# Patient Record
Sex: MALE
[De-identification: names, ages, dates, MRNs, and addresses within clinical notes are randomized per-mention and may not be internally consistent; named-entity substitution may affect disease eponyms.]

---

## 2018-04-24 PROBLEM — Z00.00 ENCOUNTER FOR PREVENTIVE HEALTH EXAMINATION: Status: ACTIVE | Noted: 2018-04-24

## 2018-04-26 ENCOUNTER — APPOINTMENT (OUTPATIENT)
Dept: CARDIOLOGY | Facility: CLINIC | Age: 83
End: 2018-04-26

## 2018-04-26 VITALS
HEIGHT: 67 IN | TEMPERATURE: 97.8 F | BODY MASS INDEX: 39.87 KG/M2 | SYSTOLIC BLOOD PRESSURE: 138 MMHG | DIASTOLIC BLOOD PRESSURE: 56 MMHG | WEIGHT: 254 LBS | HEART RATE: 76 BPM | OXYGEN SATURATION: 100 %

## 2018-04-26 DIAGNOSIS — Z86.39 PERSONAL HISTORY OF OTHER ENDOCRINE, NUTRITIONAL AND METABOLIC DISEASE: ICD-10-CM

## 2018-04-26 DIAGNOSIS — Z86.59 PERSONAL HISTORY OF OTHER MENTAL AND BEHAVIORAL DISORDERS: ICD-10-CM

## 2018-04-26 DIAGNOSIS — Z87.19 PERSONAL HISTORY OF OTHER DISEASES OF THE DIGESTIVE SYSTEM: ICD-10-CM

## 2018-04-26 DIAGNOSIS — Z87.09 PERSONAL HISTORY OF OTHER DISEASES OF THE RESPIRATORY SYSTEM: ICD-10-CM

## 2018-04-26 DIAGNOSIS — Z87.39 PERSONAL HISTORY OF OTHER DISEASES OF THE MUSCULOSKELETAL SYSTEM AND CONNECTIVE TISSUE: ICD-10-CM

## 2018-04-26 DIAGNOSIS — Z87.898 PERSONAL HISTORY OF OTHER SPECIFIED CONDITIONS: ICD-10-CM

## 2018-04-26 DIAGNOSIS — R06.09 OTHER FORMS OF DYSPNEA: ICD-10-CM

## 2018-04-26 DIAGNOSIS — Z87.891 PERSONAL HISTORY OF NICOTINE DEPENDENCE: ICD-10-CM

## 2018-04-26 DIAGNOSIS — R73.03 PREDIABETES.: ICD-10-CM

## 2018-04-26 DIAGNOSIS — Z86.79 PERSONAL HISTORY OF OTHER DISEASES OF THE CIRCULATORY SYSTEM: ICD-10-CM

## 2018-04-26 DIAGNOSIS — R01.1 CARDIAC MURMUR, UNSPECIFIED: ICD-10-CM

## 2018-04-27 ENCOUNTER — NON-APPOINTMENT (OUTPATIENT)
Age: 83
End: 2018-04-27

## 2018-04-28 PROBLEM — Z87.39 HISTORY OF ARTHRITIS: Status: RESOLVED | Noted: 2018-04-28 | Resolved: 2018-04-28

## 2018-04-28 PROBLEM — R73.03 PRE-DIABETES: Status: RESOLVED | Noted: 2018-04-28 | Resolved: 2018-04-28

## 2018-04-28 PROBLEM — Z87.898 HISTORY OF PALPITATIONS: Status: RESOLVED | Noted: 2018-04-28 | Resolved: 2018-04-28

## 2018-04-28 PROBLEM — Z87.39 HISTORY OF NECK PAIN: Status: RESOLVED | Noted: 2018-04-28 | Resolved: 2018-04-28

## 2018-04-28 PROBLEM — Z86.59 HISTORY OF DEPRESSION: Status: RESOLVED | Noted: 2018-04-28 | Resolved: 2018-04-28

## 2018-04-28 PROBLEM — R01.1 CARDIAC MURMUR: Status: ACTIVE | Noted: 2018-04-26

## 2018-04-28 PROBLEM — Z86.39 HISTORY OF HYPERLIPIDEMIA: Status: RESOLVED | Noted: 2018-04-28 | Resolved: 2018-04-28

## 2018-04-28 PROBLEM — Z87.19 HISTORY OF INGUINAL HERNIA: Status: RESOLVED | Noted: 2018-04-28 | Resolved: 2018-04-28

## 2018-04-28 PROBLEM — Z86.79 HISTORY OF HYPERTENSION: Status: RESOLVED | Noted: 2018-04-28 | Resolved: 2018-04-28

## 2018-04-28 PROBLEM — R06.09 DYSPNEA ON EXERTION: Status: ACTIVE | Noted: 2018-04-26

## 2018-04-28 PROBLEM — Z87.19 HISTORY OF CHRONIC CONSTIPATION: Status: RESOLVED | Noted: 2018-04-28 | Resolved: 2018-04-28

## 2018-04-28 PROBLEM — Z87.09 HISTORY OF ASTHMA: Status: RESOLVED | Noted: 2018-04-28 | Resolved: 2018-04-28

## 2018-04-28 PROBLEM — Z87.891 FORMER SMOKER: Status: ACTIVE | Noted: 2018-04-28

## 2018-04-28 RX ORDER — ASPIRIN 81 MG
81 TABLET, DELAYED RELEASE (ENTERIC COATED) ORAL
Refills: 0 | Status: ACTIVE | COMMUNITY

## 2018-04-28 RX ORDER — ATENOLOL 50 MG/1
TABLET ORAL
Refills: 0 | Status: ACTIVE | COMMUNITY

## 2018-04-28 RX ORDER — OXYCODONE AND ACETAMINOPHEN TABLETS 10; 300 MG/1; MG/1
TABLET ORAL
Refills: 0 | Status: ACTIVE | COMMUNITY

## 2018-04-28 RX ORDER — ESCITALOPRAM OXALATE 5 MG/1
TABLET, FILM COATED ORAL
Refills: 0 | Status: ACTIVE | COMMUNITY

## 2018-05-07 ENCOUNTER — HOSPITAL ENCOUNTER (INPATIENT)
Dept: HOSPITAL 74 - FER | Age: 83
LOS: 3 days | Discharge: HOME HEALTH | DRG: 351 | End: 2018-05-10
Attending: INTERNAL MEDICINE | Admitting: INTERNAL MEDICINE
Payer: COMMERCIAL

## 2018-05-07 VITALS — BODY MASS INDEX: 31.6 KG/M2

## 2018-05-07 DIAGNOSIS — M54.9: ICD-10-CM

## 2018-05-07 DIAGNOSIS — J90: ICD-10-CM

## 2018-05-07 DIAGNOSIS — E78.5: ICD-10-CM

## 2018-05-07 DIAGNOSIS — B37.0: ICD-10-CM

## 2018-05-07 DIAGNOSIS — I10: ICD-10-CM

## 2018-05-07 DIAGNOSIS — F41.8: ICD-10-CM

## 2018-05-07 DIAGNOSIS — R59.0: ICD-10-CM

## 2018-05-07 DIAGNOSIS — R16.0: ICD-10-CM

## 2018-05-07 DIAGNOSIS — I27.20: ICD-10-CM

## 2018-05-07 DIAGNOSIS — K40.30: Primary | ICD-10-CM

## 2018-05-07 DIAGNOSIS — E87.1: ICD-10-CM

## 2018-05-07 DIAGNOSIS — N39.0: ICD-10-CM

## 2018-05-07 DIAGNOSIS — I08.3: ICD-10-CM

## 2018-05-07 DIAGNOSIS — I45.10: ICD-10-CM

## 2018-05-07 DIAGNOSIS — I45.81: ICD-10-CM

## 2018-05-07 DIAGNOSIS — N40.0: ICD-10-CM

## 2018-05-07 DIAGNOSIS — K76.9: ICD-10-CM

## 2018-05-07 DIAGNOSIS — Z87.891: ICD-10-CM

## 2018-05-07 DIAGNOSIS — E87.5: ICD-10-CM

## 2018-05-07 DIAGNOSIS — D64.9: ICD-10-CM

## 2018-05-07 DIAGNOSIS — K75.9: ICD-10-CM

## 2018-05-07 DIAGNOSIS — M19.90: ICD-10-CM

## 2018-05-07 DIAGNOSIS — R91.1: ICD-10-CM

## 2018-05-07 DIAGNOSIS — K40.90: ICD-10-CM

## 2018-05-07 DIAGNOSIS — R01.1: ICD-10-CM

## 2018-05-07 LAB
ALBUMIN SERPL-MCNC: 3 G/DL (ref 3.5–5)
ALP SERPL-CCNC: 129 U/L (ref 32–92)
ALT SERPL-CCNC: 29 U/L (ref 10–40)
ANION GAP SERPL CALC-SCNC: 12 MMOL/L (ref 8–16)
APTT BLD: 24.2 SECONDS (ref 24–38.9)
AST SERPL-CCNC: 33 U/L (ref 10–42)
BACTERIA #/AREA URNS HPF: (no result) /HPF
BILIRUB SERPL-MCNC: 0.9 MG/DL (ref 0.2–1)
BILIRUB UR STRIP.AUTO-MCNC: (no result) MG/DL
BUN SERPL-MCNC: 33 MG/DL (ref 7–18)
CALCIUM SERPL-MCNC: 8.4 MG/DL (ref 8.4–10.2)
CHLORIDE SERPL-SCNC: 93 MMOL/L (ref 98–107)
CO2 SERPL-SCNC: 23 MMOL/L (ref 22–28)
COLOR UR: YELLOW
CREAT SERPL-MCNC: 1.1 MG/DL (ref 0.6–1.3)
DEPRECATED RDW RBC AUTO: 15.3 % (ref 11.9–15.9)
DEPRECATED RDW RBC AUTO: 16.8 % (ref 11.9–15.9)
EPITH CASTS URNS QL MICRO: (no result) /HPF
GLUCOSE SERPL-MCNC: 142 MG/DL (ref 74–106)
HCT VFR BLD CALC: 28.1 % (ref 35.4–49)
HCT VFR BLD CALC: 33.7 % (ref 35.4–49)
HGB BLD-MCNC: 11.1 GM/DL (ref 11.7–16.9)
HGB BLD-MCNC: 9.3 GM/DL (ref 11.7–16.9)
INR BLD: 1.19 (ref 0.82–1.09)
KETONES UR QL STRIP: (no result)
MCH RBC QN AUTO: 26 PG (ref 25.7–33.7)
MCH RBC QN AUTO: 26.3 PG (ref 25.7–33.7)
MCHC RBC AUTO-ENTMCNC: 32.9 G/DL (ref 32–35.9)
MCHC RBC AUTO-ENTMCNC: 33.2 G/DL (ref 32–35.9)
MCV RBC: 78.9 FL (ref 80–96)
MCV RBC: 79.2 FL (ref 80–96)
PH UR: 5 [PH] (ref 4.5–8)
PLATELET # BLD AUTO: 271 K/MM3 (ref 134–434)
PLATELET # BLD AUTO: 359 K/MM3 (ref 134–434)
PLATELET BLD QL SMEAR: ADEQUATE
PMV BLD: 7.8 FL (ref 7.5–11.1)
PMV BLD: 7.8 FL (ref 7.5–11.1)
POTASSIUM SERPLBLD-SCNC: 5.2 MMOL/L (ref 3.5–5.1)
PROT SERPL-MCNC: 6.9 G/DL (ref 6.4–8.3)
PT PNL PPP: 13.3 SEC (ref 10.2–13)
RBC # BLD AUTO: (no result) /HPF (ref 0–3)
RBC # BLD AUTO: 3.55 M/MM3 (ref 4–5.6)
RBC # BLD AUTO: 4.27 M/MM3 (ref 4–5.6)
SODIUM SERPL-SCNC: 128 MMOL/L (ref 136–145)
SP GR UR: >= 1.03 (ref 1–1.02)
UROBILINOGEN UR STRIP-MCNC: 0.2 MG/DL (ref 0.2–1)
WBC # BLD AUTO: 10 K/MM3 (ref 4–10)
WBC # BLD AUTO: 12.2 K/MM3 (ref 4–10.8)
WBC # UR AUTO: (no result) /UL (ref 0–2)

## 2018-05-07 NOTE — HP
CHIEF COMPLAINT: abdominal pain, n/v





PCP: Dr. Azul ([phone])


FAMILY NUMBERS:


HOME: [phone]


CELL: [phone] (son, Manolo)





HISTORY OF PRESENT ILLNESS:





83 year old male with a history of HTN, osteoarthritis, hx of hepatitis, 

bilateral inguinal hernias and cholecystectomy in 2000 presents to the hospital 

with 3 day hx of n/v/abdominal pain. Patient states that he was treated for UTI/

hematuria a week ago with amoxicillin. He reports that after he finished taking 

the course of antibiotic, he began having multiple episodes of non-bloody 

emesis. Patient's family reports that the vomitus appeared green and bilious at 

times. He has been unable to take anything by mouth. Patient states that he is 

passing gas, but his last bowel movement was yesterday and was solid/hard. 

Patient states that his He denies current abdominal pain, chest pain, shortness 

of breath, fevers, chills.





ER course was notable for:


(1) hyponatremia (128)


(2) hyperkalemia (5.2)


(3) WBC 12.2





PAST MEDICAL HISTORY: HTN. arthritis, hepatitis, b/l inguinal hernias





PAST SURGICAL HISTORY: cholecystectomy 2000





Social History:


Smoking: none


Alcohol: none


Drugs: none





Allergies





No Known Allergies Allergy (Unverified 05/07/18 14:37)


 








HOME MEDICATIONS:


 Home Medications











 Medication  Instructions  Recorded


 


Amlodipine Besylate [Norvasc -] 10 mg PO DAILY 05/07/18


 


Aspirin [Aspirin EC] 81 mg PO DAILY 05/07/18


 


Citalopram Hydrobromide [Celexa -] 20 mg PO DAILY 05/07/18


 


Labetalol HCl [Normodyne -] 300 mg PO BID 05/07/18


 


Oxycodone HCl/Acetaminophen 1 tab PO Q6H 05/07/18





[Percocet 5-325 mg Tablet]  


 


Simvastatin [Zocor -] 40 mg PO DAILY 05/07/18








REVIEW OF SYSTEMS


CONSTITUTIONAL: 


Absent:  fever, chills, diaphoresis, generalized weakness, malaise, loss of 

appetite, weight change


HEENT: 


Absent:  rhinorrhea, nasal congestion, throat pain, throat swelling, difficulty 

swallowing, mouth swelling, ear pain, eye pain, visual changes


CARDIOVASCULAR: 


Absent: chest pain, syncope, palpitations, irregular heart rate, lightheadedness

, peripheral edema


RESPIRATORY: 


Absent: cough, shortness of breath, dyspnea with exertion, orthopnea, wheezing, 

stridor, hemoptysis


GASTROINTESTINAL: nausea, vomiting


Absent: abdominal pain, abdominal distension,  diarrhea, constipation, melena, 

hematochezia


GENITOURINARY: 


Absent: dysuria, frequency, urgency, hesitancy, hematuria, flank pain, genital 

pain


MUSCULOSKELETAL: 


Absent: myalgia, arthralgia, joint swelling, back pain, neck pain


SKIN: 


Absent: rash, itching, pallor


HEMATOLOGIC/IMMUNOLOGIC: 


Absent: easy bleeding, easy bruising, lymphadenopathy, frequent infections


ENDOCRINE:


Absent: unexplained weight gain, unexplained weight loss, heat intolerance, 

cold intolerance


NEUROLOGIC: 


Absent: headache, focal weakness or paresthesias, dizziness, unsteady gait, 

seizure, mental status changes, bladder or bowel incontinence


PSYCHIATRIC: 


Absent: anxiety, depression, suicidal or homicidal ideation, hallucinations.








PHYSICAL EXAMINATION


 Vital Signs - 24 hr











  05/07/18 05/07/18 05/07/18





  13:32 16:15 18:00


 


Temperature 98.6 F 98.9 F 


 


Pulse Rate 84  85


 


Pulse Rate [  88 





Right Radial]   


 


Respiratory 20 22 





Rate   


 


Blood Pressure 146/71  


 


Blood Pressure  138/78 





[Left Arm]   


 


O2 Sat by Pulse 96 95 95





Oximetry (%)   














  05/07/18 05/07/18 05/07/18





  18:50 19:00 20:01


 


Temperature 98.8 F  98.8 F


 


Pulse Rate 85  85


 


Pulse Rate [   





Right Radial]   


 


Respiratory 22  22





Rate   


 


Blood Pressure 124/55  124/55


 


Blood Pressure   





[Left Arm]   


 


O2 Sat by Pulse  93 L 





Oximetry (%)   











GENERAL: Awake, alert, and fully oriented, in no acute distress.


HEENT: PERRLA, EOMI, dry mucus membranes


NECK: No JVD


LUNGS: CTA, no wheezes


HEART: Regular rate and rhythm, normal S1 and S2, harsh systolic murmur 

appreciated on exam


ABDOMEN: Soft, nontender, not distended, normoactive bowel sounds, no guarding, 

no rebound, no masses.  No hepatomegaly or  splenomegaly. 


MUSCULOSKELETAL: Normal range of motion at all joints. No bony deformities or 

tenderness. No CVA tenderness.


EXTREMITIES: 2+ pulses, No peripheral edema.


NEUROLOGICAL:  Cranial nerves II-XII intact. Normal speech


PSYCHIATRIC: Cooperative. Good eye contact. Appropriate mood and affect.


SKIN: Warm, dry, normal turgor, no rashes or lesions noted, normal capillary 

refill. 





 Laboratory Results - last 24 hr











  05/07/18 05/07/18 05/07/18





  14:07 14:30 14:30


 


WBC   12.2 H 


 


RBC   4.27 


 


Hgb   11.1 L 


 


Hct   33.7 L 


 


MCV   78.9 L 


 


MCH   26.0 


 


MCHC   32.9 


 


RDW   15.3 


 


Plt Count   359 


 


MPV   7.8 


 


Neutrophils %   No Result Required. 


 


Neutrophils % (Manual)   85.0 H 


 


Band Neutrophils %   11.0 H 


 


Lymphocytes %   No Result Required. 


 


Lymphocytes % (Manual)   2.0 L 


 


Monocytes % (Manual)   2 L 


 


Platelet Estimate   Adequate 


 


PT with INR    13.3 H


 


INR    1.19


 


PTT (Actin FS)    24.2 L


 


Sodium   


 


Potassium   


 


Chloride   


 


Carbon Dioxide   


 


Anion Gap   


 


BUN   


 


Creatinine   


 


Creat Clearance w eGFR   


 


Random Glucose   


 


Lactic Acid   


 


Calcium   


 


Total Bilirubin   


 


AST   


 


ALT   


 


Alkaline Phosphatase   


 


Troponin I   


 


Total Protein   


 


Albumin   


 


Lipase   


 


Urine Color  Yellow  


 


Urine Appearance  Clear  


 


Urine pH  5.0  


 


Ur Specific Gravity  >= 1.030 H  


 


Urine Protein  Trace  


 


Urine Glucose (UA)  Negative  


 


Urine Ketones  2+ H  


 


Urine Blood  1+ H  


 


Urine Nitrite  Negative  


 


Urine Bilirubin  2+ H  


 


Urine Urobilinogen  0.2  


 


Ur Leukocyte Esterase  Negative  


 


Urine RBC  8-10  


 


Urine WBC  0-3  


 


Ur Epithelial Cells  Rare  


 


Urine Bacteria  Rare  














  05/07/18 05/07/18 05/07/18





  14:30 14:30 14:30


 


WBC   


 


RBC   


 


Hgb   


 


Hct   


 


MCV   


 


MCH   


 


MCHC   


 


RDW   


 


Plt Count   


 


MPV   


 


Neutrophils %   


 


Neutrophils % (Manual)   


 


Band Neutrophils %   


 


Lymphocytes %   


 


Lymphocytes % (Manual)   


 


Monocytes % (Manual)   


 


Platelet Estimate   


 


PT with INR   


 


INR   


 


PTT (Actin FS)   


 


Sodium  128 L  


 


Potassium  5.2 H  


 


Chloride  93 L  


 


Carbon Dioxide  23  


 


Anion Gap  12  


 


BUN  33 H  


 


Creatinine  1.1  


 


Creat Clearance w eGFR  > 60  


 


Random Glucose  142 H  


 


Lactic Acid   


 


Calcium  8.4  


 


Total Bilirubin  0.9  


 


AST  33  


 


ALT  29  


 


Alkaline Phosphatase  129 H  


 


Troponin I    < 0.03


 


Total Protein  6.9  


 


Albumin  3.0 L  


 


Lipase   53 L 


 


Urine Color   


 


Urine Appearance   


 


Urine pH   


 


Ur Specific Gravity   


 


Urine Protein   


 


Urine Glucose (UA)   


 


Urine Ketones   


 


Urine Blood   


 


Urine Nitrite   


 


Urine Bilirubin   


 


Urine Urobilinogen   


 


Ur Leukocyte Esterase   


 


Urine RBC   


 


Urine WBC   


 


Ur Epithelial Cells   


 


Urine Bacteria   














  05/07/18





  17:51


 


WBC 


 


RBC 


 


Hgb 


 


Hct 


 


MCV 


 


MCH 


 


MCHC 


 


RDW 


 


Plt Count 


 


MPV 


 


Neutrophils % 


 


Neutrophils % (Manual) 


 


Band Neutrophils % 


 


Lymphocytes % 


 


Lymphocytes % (Manual) 


 


Monocytes % (Manual) 


 


Platelet Estimate 


 


PT with INR 


 


INR 


 


PTT (Actin FS) 


 


Sodium 


 


Potassium 


 


Chloride 


 


Carbon Dioxide 


 


Anion Gap 


 


BUN 


 


Creatinine 


 


Creat Clearance w eGFR 


 


Random Glucose 


 


Lactic Acid  1.1


 


Calcium 


 


Total Bilirubin 


 


AST 


 


ALT 


 


Alkaline Phosphatase 


 


Troponin I 


 


Total Protein 


 


Albumin 


 


Lipase 


 


Urine Color 


 


Urine Appearance 


 


Urine pH 


 


Ur Specific Gravity 


 


Urine Protein 


 


Urine Glucose (UA) 


 


Urine Ketones 


 


Urine Blood 


 


Urine Nitrite 


 


Urine Bilirubin 


 


Urine Urobilinogen 


 


Ur Leukocyte Esterase 


 


Urine RBC 


 


Urine WBC 


 


Ur Epithelial Cells 


 


Urine Bacteria 





IMAGING:


CT Abd/Pelvis (5/7):


1. High-grade small bowel obstruction is presumably complete, with abrupt 

transition point in a large RIGHT inguinal hernia. Collapsed distal small bowel 

and colon. 


2. Very large LEFT inguinal hernia containing nearly the entire descending and 

sigmoid colon with no evidence of obstruction. 


3. Multiple indeterminant hepatic mass lesions, concerning for metastasis. 

Please correlate with medical history. Complete characterization requires 

multiphase contrast-enhanced MRI or CT of the liver. 


4. Enlarged portacaval and peripancreatic lymph nodes are also concerning for 

malignancy.


5. Small layering right pleural effusion. Opacity in the posterior right lung 

base may be atelectasis and/or pneumonia. 


6. Cardiomegaly, at least moderate coronary artery calcific atherosclerosis and 

aortic valve calcification. Please correlate clinically for aortic stenosis. 


7. Enlarged prostate gland. Please correlate with PSA and physical exam.








ASSESSMENT/PLAN:





83 year old male hx HTN and large bilateral hernias presented to the hospital 

with n/v and abdominal pain and found to have total small bowel obstruction 

within right inguinal hernia





#Complete Small Bowel Obstruction: patient is clinically improving with no 

overt abdominal pain and decreased frequency of vomiting


-NPO except meds


-IVF w/ NS @ 100cc/hr


-surgery consult Dr. Olmstead Appreciated


-coags, type and screen


-repeat labs in AM


-lactate normal


-NGT offered, but family decided to wait until the morning to check for 

improvement





#Harsh Systolic Ejection Murmur: patient had just seen a cardiologist the prior 

day and an echo was ordered by never performed


-cards consult Dr. Rush appreciated


-echo ordered


-EKG: NSR w/ RBBB, QTc prolonged at 503, avoid QTC prolonging agents





#Hyponatremia: patient's sodium 128


-IVF NS @ 100cc/hr


-repeat labs now and in AM





#Hyperkalemia: 5.2


-monitor potassium in AM





#Liver Lesions: could be related to metastatic process


-oncology consultation appreciated


-consider multi-phsae contrast MRI as per recommendations from radiology





#Anemia: could be chronic vs related to obstruction


-Hgb 11.1





#FEN


-NS @ 100cc/hr


-NPO except meds/some ice chips


-replete lytes in AM as necessary





#Prophylaxis


-heparin 5000 subq TID





#Disposition


-Monitor on med surg


-family wants to make decisions together for patient, phone numbers listed on 

top of note


-full code for now





Visit type





- Emergency Visit


Emergency Visit: No





- New Patient


This patient is new to me today: Yes


Date on this admission: 05/07/18





- Critical Care


Critical Care patient: No





Hospitalist Screening





- Colonoscopy Questionnaire


Colonoscopy Questionnaire: 





Colonoscopy Questionnaire








-   Patient:


50 - 75 years old and never had a screening colonoscopy: No


History of colon or rectal polyps, or CA: No


History of IBD, Crohn's disease or UC: No


History of abdominal radiation therapy as a child: No





-   Relative:


1 with colon or rectal CA, or polyps at age 60 or younger: Unknown


Colon or rectal CA diagnosed at age 45 or younger: Unknown


Multiple relatives with colon or rectal CA: Unknown





-   Outcome:


Screening Result: Negative Screen

## 2018-05-07 NOTE — PDOC
History of Present Illness





- General


History Source: Patient, Family


Exam Limitations: No Limitations





<Yann Sharma - Last Filed: 05/07/18 17:37>





- General


History Source: Patient, Family


Exam Limitations: No Limitations





- History of Present Illness


Initial Comments: 





05/07/18 15:27


The patient is an 83 year old male with a significant past medical history of 

hypertension, inguinal hernia, chronic back pain, anxiety, hyperlipidemia, and 

depression who presents to the emergency for evaluation of a 2 day history of 

nausea and vomiting. The patient reports persistent episodes of nausea, vomiting

, constipation, and lack of appetite. He reports intermittent episodes of 

abdominal discomfort. Of note, the patient reports being recently diagnosed for 

a urinary tract infection and finished course of amoxicillin one week ago. 





The patient denies chest pain, shortness of breath, headache, and dizziness.


Denies fevers, chills, diarrhea, and changes to urination. 





Allergies: NKDA


Past surgical history: Inguinal hernia, Cholecystectomy


Social history: Former smoker 2003. No reported alcohol or drug use.


PCP: Dr. Azul ([phone])








<Tulio Anderson - Last Filed: 05/07/18 18:23>





- General


Chief Complaint: Nausea/Vomiting


Stated Complaint: VOMITING


Time Seen by Provider: 05/07/18 13:41





Past History





- Past Medical History


COPD: No


HTN: Yes


Hypercholesterolemia: Yes


Psychiatric Problems: Yes (DEPRESSION)


Other medical history: INGUINAL HERNIA





- Surgical History


Cholecystectomy: Yes





- Suicide/Smoking/Psychosocial Hx


Smoking History: Former smoker


Have you smoked in the past 12 months: No


If you are a former smoker, when did you quit?: 2003


Information on smoking cessation initiated: No


Hx Alcohol Use: No


Drug/Substance Use Hx: No


Substance Use Type: None





<Yann Sharma - Last Filed: 05/07/18 17:37>





<Tulio Anderson - Last Filed: 05/07/18 18:23>





- Past Medical History


Allergies/Adverse Reactions: 


 Allergies











Allergy/AdvReac Type Severity Reaction Status Date / Time


 


No Known Allergies Allergy   Unverified 05/07/18 14:37











Home Medications: 


Ambulatory Orders





Amlodipine Besylate [Norvasc -] 10 mg PO DAILY 05/07/18 


Aspirin [Aspirin EC] 81 mg PO DAILY 05/07/18 


Citalopram Hydrobromide [Celexa -] 20 mg PO DAILY 05/07/18 


Labetalol HCl [Normodyne -] 300 mg PO BID 05/07/18 


Simvastatin [Zocor -] 40 mg PO DAILY 05/07/18 











**Review of Systems





- Review of Systems


Able to Perform ROS?: Yes


Comments:: 





GENERAL/CONSTITUTIONAL: No fever or chills. No weakness.


HEAD, EYES, EARS, NOSE AND THROAT: No change in vision. No ear pain or 

discharge. No sore throat.


CARDIOVASCULAR: No chest pain or shortness of breath.


RESPIRATORY: No cough, wheezing, or hemoptysis.


GASTROINTESTINAL: (+)Nausea. (+)Vomiting. (+)Constipation. 


GENITOURINARY: No dysuria, frequency, or change in urination.


MUSCULOSKELETAL: (+)Abdominal discomfort. No joint or muscle swelling. No neck 

or back pain.


SKIN: No rash


NEUROLOGIC: No headache, vertigo, loss of consciousness, or change in strength/

sensation.


ENDOCRINE: No increased thirst. No abnormal weight change.


HEMATOLOGIC/LYMPHATIC: No anemia, easy bleeding, or history of blood clots.


ALLERGIC/IMMUNOLOGIC: No hives or skin allergy.





<Tulio Anderson - Last Filed: 05/07/18 18:23>





*Physical Exam





- Vital Signs


 Last Vital Signs











Temp Pulse Resp BP Pulse Ox


 


 98.6 F   84   20   146/71   96 


 


 05/07/18 13:32  05/07/18 13:32  05/07/18 13:32  05/07/18 13:32  05/07/18 13:32














<Yann Sharma - Last Filed: 05/07/18 17:37>





- Vital Signs


 Last Vital Signs











Temp Pulse Resp BP Pulse Ox


 


 98.6 F   84   20   146/71    


 


 05/07/18 13:32  05/07/18 13:32  05/07/18 13:32  05/07/18 13:32  05/07/18 13:32














- Physical Exam


Comments: 





GENERAL: (+)Obese. Awake, alert, and fully oriented, in no acute distress


HEAD: No signs of trauma


EYES: PERRLA, EOMI, sclera anicteric, conjunctiva clear


ENT: Auricles normal inspection, hearing grossly normal, nares patent, Moist 

mucosa


NECK: Normal ROM, supple, no JVD, or masses


LUNGS: Breath sounds equal, clear to auscultation bilaterally.  No wheezes, and 

no crackles


HEART: Regular rate and rhythm, normal S1 and S2, no murmurs, rubs or gallops


ABDOMEN: Soft, nontender. No guarding, no rebound.  No masses


MUSCULOSKELETAL: (+)Large inguinal hernia on left. No testicular tenderness. 


EXTREMITIES: Normal range of motion, no edema.  No clubbing or cyanosis. No 

cords, erythema, or tenderness


NEUROLOGICAL: Cranial nerves II through XII grossly intact.  Normal speech, 

normal gait


SKIN: Warm, Dry, normal turgor, no rashes or lesions noted.








<Tulio Anderson - Last Filed: 05/07/18 18:23>





ED Treatment Course





- LABORATORY


CBC & Chemistry Diagram: 


 05/07/18 14:30





 05/07/18 14:30





- RADIOLOGY


Radiology Studies Ordered: 














 Category Date Time Status


 


 ABDOMEN & PELVIS CT WITH CONTR [CT] Stat CT Scan  05/07/18 14:44 Ordered


 


 CHEST X-RAY PORTABLE* [RAD] Stat Radiology  05/07/18 13:56 Taken














<Yann Sharma - Last Filed: 05/07/18 17:37>





- LABORATORY


CBC & Chemistry Diagram: 


 05/07/18 14:30





 05/07/18 14:30





- ADDITIONAL ORDERS


Additional order review: 


 Laboratory  Results











  05/07/18 05/07/18





  14:30 14:07


 


Sodium  128 L 


 


Potassium  5.2 H 


 


Chloride  93 L 


 


Carbon Dioxide  23 


 


Anion Gap  12 


 


BUN  33 H 


 


Creatinine  1.1 


 


Creat Clearance w eGFR  > 60 


 


Random Glucose  142 H 


 


Calcium  8.4 


 


Total Bilirubin  0.9 


 


AST  33 


 


ALT  29 


 


Alkaline Phosphatase  129 H 


 


Total Protein  6.9 


 


Albumin  3.0 L 


 


Urine Color   Yellow


 


Urine Appearance   Clear


 


Urine pH   5.0


 


Ur Specific Gravity   >= 1.030 H


 


Urine Protein   Trace


 


Urine Glucose (UA)   Negative


 


Urine Ketones   2+ H


 


Urine Blood   1+ H


 


Urine Nitrite   Negative


 


Urine Bilirubin   2+ H


 


Urine Urobilinogen   0.2


 


Ur Leukocyte Esterase   Negative








 











  05/07/18





  14:30


 


RBC  4.27


 


MCV  78.9 L


 


MCHC  32.9


 


RDW  15.3


 


MPV  7.8


 


Neutrophils %  No Result Required.


 


Lymphocytes %  No Result Required.














- Medications


Given in the ED: 


ED Medications














Discontinued Medications














Generic Name Dose Route Start Last Admin





  Trade Name Freq  PRN Reason Stop Dose Admin


 


Lorazepam  1 mg  05/07/18 14:40  05/07/18 15:18





  Ativan Injection -  IVPUSH  05/07/18 14:41  1 mg





  ONCE ONE   Administration














<Tulio Anderson - Last Filed: 05/07/18 18:23>





Medical Decision Making





- Medical Decision Making





05/07/18 14:49





A portion of this note was documented by scribe services under my direction. I 

have reviewed the details of the note, within reason, and agree with the 

documentation with the following case summary and management plan written by 

me. 





Patient treated in the ED.





Nursing notes are reviewed and incorporated into the medical decision-making.


Vital signs reviewed.





Peripheral IV access obtained by the nurse, laboratory studies are drawn and 

sent, reviewed and interpreted by myself. 





 Vital Signs











Temp Pulse Resp BP Pulse Ox


 


 98.6 F   84   20   146/71   96 


 


 05/07/18 13:32  05/07/18 13:32  05/07/18 13:32  05/07/18 13:32  05/07/18 13:32








83-year-old male with past medical history of hypertension, hyperlipidemia, 

anxiety and depression, left inguinal hernia presents with nausea and vomiting. 

Several weeks ago, the patient was recently diagnosed with a urinary tract 

infection and was placed on 2 prescriptions including most recently amoxicillin 

that was completed one week ago. The last several days, the patient has been 

having persistent nausea and vomiting and decreased appetite. Has had 

intermittent abdominal discomfort but no diarrhea. The patient does report 

having constipation and unclear when last bowel movement. However, the patient 

does take narcotics for his chronic back pain. Patient denies fevers or chills. 

Denies chest pain or shortness of breath.





Differential includes gastroenteritis, constipation, bowel obstruction, hernia, 

urinary tract infection, acute coronary syndrome. We'll obtain labs, chest x-ray

, urinalysis and a CAT scan the abdomen pelvis and reassess.


05/07/18 17:32





 CBC, BMP





 05/07/18 14:30 





 05/07/18 14:30 





 CMP











Sodium  128 mmol/L (136-145)  L  05/07/18  14:30    


 


Potassium  5.2 mmol/L (3.5-5.1)  H  05/07/18  14:30    


 


Chloride  93 mmol/L ()  L  05/07/18  14:30    


 


Carbon Dioxide  23 mmol/L (22-28)   05/07/18  14:30    


 


Anion Gap  12  (8-16)   05/07/18  14:30    


 


BUN  33 mg/dl (7-18)  H  05/07/18  14:30    


 


Creatinine  1.1 mg/dl (0.6-1.3)   05/07/18  14:30    


 


Creat Clearance w eGFR  > 60  (>60)   05/07/18  14:30    


 


Random Glucose  142 mg/dl ()  H  05/07/18  14:30    


 


Calcium  8.4 mg/dl (8.4-10.2)   05/07/18  14:30    


 


Total Bilirubin  0.9 mg/dl (0.2-1.0)   05/07/18  14:30    


 


AST  33 U/L (10-42)   05/07/18  14:30    


 


ALT  29 U/L (10-40)   05/07/18  14:30    


 


Alkaline Phosphatase  129 U/L (32-92)  H  05/07/18  14:30    


 


Troponin I  < 0.03 ng/ml (0.00-0.06)   05/07/18  14:30    


 


Total Protein  6.9 g/dl (6.4-8.3)   05/07/18  14:30    


 


Albumin  3.0 g/dl (3.5-5.0)  L  05/07/18  14:30    


 


Lipase  53 U/L ()  L  05/07/18  14:30    








 Urine Test Results











Urine Color  Yellow   05/07/18  14:07    


 


Urine Appearance  Clear   05/07/18  14:07    


 


Urine pH  5.0  (4.5-8)   05/07/18  14:07    


 


Ur Specific Gravity  >= 1.030  (1.005-1.025)  H  05/07/18  14:07    


 


Urine Protein  Trace  (NEGATIVE)   05/07/18  14:07    


 


Urine Glucose (UA)  Negative  (NEGATIVE)   05/07/18  14:07    


 


Urine Ketones  2+  (NEGATIVE)  H  05/07/18  14:07    


 


Urine Blood  1+  (NEGATIVE)  H  05/07/18  14:07    


 


Urine Nitrite  Negative  (NEGATIVE)   05/07/18  14:07    


 


Urine Bilirubin  2+  (NEGATIVE)  H  05/07/18  14:07    


 


Ur Leukocyte Esterase  Negative  (NEGATIVE)   05/07/18  14:07    


 


Urine RBC  8-10 /hpf (0-3)   05/07/18  14:07    


 


Urine WBC  0-3  (0-2)   05/07/18  14:07    


 


Ur Epithelial Cells  Rare /HPF  05/07/18  14:07    


 


Urine Bacteria  Rare /hpf (NEGATIVE)   05/07/18  14:07    











05/07/18 17:37


CAT scan demonstrates high grade small bowel obstruction with abrupt transition 

point and a large right inguinal hernia. There is also very large left inguinal 

hernia. There is also hepatic mass lesions concerning for metastasis. There is 

also enlarged portacaval and pancreatic lymph nodes concerning for malignancy. 

Small right pleural effusion. I had given these results to the patient and the 

patient's wife. I also instructed him that I am concerned for cancer for this 

patient. The patient will need a malignancy workup. Given these symptoms and 

concerns, I had placed the patient for surgeon Dr. Nikko Olmstead. Bands were 

noted to be 11%. Blood cultures and lactic acid was ordered and empiric 

antibiotics vancomycin and Zosyn were ordered. In the complexity of the patient'

s case, decision was made to admit the patient to [facility]. The 

patient's family is agreeable to the plan. This is discussed with [facility] who accepts the patient to medical surgical admission. 





Case discussed in detail with admitting physician including history, physical 

exam and ancillary studies.





Admitting physician has assumed care for the patient, will follow all pending 

diagnostics and will complete the evaluation and treatment.  





<Yann Sharma - Last Filed: 05/07/18 17:37>





- Medical Decision Making





Case discussed with Dr. Olmstead at 18:23. 








<Tulio Anderson - Last Filed: 05/07/18 18:23>





*DC/Admit/Observation/Transfer





- Discharge Dispostion


Admit: Yes





<Yann Sharma - Last Filed: 05/07/18 17:37>





- Attestations


Scribe Attestion: 








Documentation prepared by Tulio Anderson, acting as medical scribe for Yann Sharma MD.





<Tulio Anderson - Last Filed: 05/07/18 18:23>


Diagnosis at time of Disposition: 


 Liver mass





Bowel obstruction


Qualifiers:


 Intestinal obstruction type: unspecified Intestinal obstruction extent: 

complete Qualified Code(s): K56.601 - Complete intestinal obstruction, 

unspecified as to cause








- Discharge Dispostion


Condition at time of disposition: Guarded





- Referrals


Referrals: 


Patel Azul MD [Primary Care Provider] - 





- Patient Instructions





- Post Discharge Activity

## 2018-05-07 NOTE — PN
Teaching Attending Note


Name of Resident: Virginia Moon





ATTENDING PHYSICIAN STATEMENT





I saw and evaluated the patient.


I reviewed the resident's note and discussed the case with the resident.


I agree with the resident's findings and plan as documented.








SUBJECTIVE:


83 M ([place]) with pmhx. of htn, inguinal hernia, chronic back pain, anxiety

, hld, and depression who presents with nausea and vomiting. States this has 

been present for two days, but he did note last BM(solid) yesterday and 

continued to have flatus today. States vomiting has completely subsided, and 

currently denies any nausea. No fevers or chills. No chest pain or pressure. No 

urinary frequency or urgency. States he recently had a UTI and finished abx 1 

weeks ago (Amoxicilin). States he has had a large inguinal hernia for several 

years. Denies any pain.





OBJECTIVE:


Physical:


VS:


 Vital Signs











 Period  Temp  Pulse  Resp  BP Sys/Matos  Pulse Ox


 


 Last 24 Hr  98.6 F-98.9 F  84-88  20-22  124-146/55-78  93-96








GEN: NAD, Resting in bed, AAOX3


HEENT: NCAT, PERRL, Throat without erythema or exudates


CARD: RRR III/VI MARI S1, S2


RESP: Mild crackles bases


ABD: BSx4, Distended, NTD to palpation


EXT: Large Left Inguinal hernia non-reducible 





 CBCD











WBC  12.2 K/mm3 (4.0-10.8)  H  05/07/18  14:30    


 


RBC  4.27 M/mm3 (4.00-5.60)   05/07/18  14:30    


 


Hgb  11.1 GM/dl (11.7-16.9)  L  05/07/18  14:30    


 


Hct  33.7 % (35.4-49)  L  05/07/18  14:30    


 


MCV  78.9 fl (80-96)  L  05/07/18  14:30    


 


MCHC  32.9 g/dl (32.0-35.9)   05/07/18  14:30    


 


RDW  15.3 % (11.9-15.9)   05/07/18  14:30    


 


Plt Count  359 K/MM3 (134-434)   05/07/18  14:30    


 


MPV  7.8 fl (7.5-11.1)   05/07/18  14:30    








 CMP











Sodium  128 mmol/L (136-145)  L  05/07/18  14:30    


 


Potassium  5.2 mmol/L (3.5-5.1)  H  05/07/18  14:30    


 


Chloride  93 mmol/L ()  L  05/07/18  14:30    


 


Carbon Dioxide  23 mmol/L (22-28)   05/07/18  14:30    


 


Anion Gap  12  (8-16)   05/07/18  14:30    


 


BUN  33 mg/dl (7-18)  H  05/07/18  14:30    


 


Creatinine  1.1 mg/dl (0.6-1.3)   05/07/18  14:30    


 


Creat Clearance w eGFR  > 60  (>60)   05/07/18  14:30    


 


Random Glucose  142 mg/dl ()  H  05/07/18  14:30    


 


Calcium  8.4 mg/dl (8.4-10.2)   05/07/18  14:30    


 


Total Bilirubin  0.9 mg/dl (0.2-1.0)   05/07/18  14:30    


 


AST  33 U/L (10-42)   05/07/18  14:30    


 


ALT  29 U/L (10-40)   05/07/18  14:30    


 


Alkaline Phosphatase  129 U/L (32-92)  H  05/07/18  14:30    


 


Total Protein  6.9 g/dl (6.4-8.3)   05/07/18  14:30    


 


Albumin  3.0 g/dl (3.5-5.0)  L  05/07/18  14:30    








 CARDIAC ENZYMES











Troponin I  < 0.03 ng/ml (0.00-0.06)   05/07/18  14:30    











CT ABD/PELVIS: High grade SBO, abrupt transition point in Right inguinal hernia

, collapsed distal Small Bowel and colon. LARGE LEFT Inguinal Hernia containing 

nearly entire descending and sigmoid colon, with NO evidence of obstruction. 

Indet. Multiple hepatic masses ? Mets. MRI/CT Liver. Enlarged Portocaval and 

peripancreatic lymph nodes, opacity post. lung bases. Enlarged prostate,


 Home Medications











 Medication  Instructions  Recorded


 


Amlodipine Besylate [Norvasc -] 10 mg PO DAILY 05/07/18


 


Aspirin [Aspirin EC] 81 mg PO DAILY 05/07/18


 


Citalopram Hydrobromide [Celexa -] 20 mg PO DAILY 05/07/18


 


Labetalol HCl [Normodyne -] 300 mg PO BID 05/07/18


 


Oxycodone HCl/Acetaminophen 1 tab PO Q6H 05/07/18





[Percocet 5-325 mg Tablet]  


 


Simvastatin [Zocor -] 40 mg PO DAILY 05/07/18











ASSESSMENT AND PLAN:





83 M with pmhx. of htn, inguinal hernia, chronic back pain, anxiety, hld, and 

depression who presents with nausea and vomiting, found to have SBO and hepatic 

masses





1.) SBO/Inguinal Hernia


- NGT- Refusing at this time as pt. wants to sleep.


- NPO


- Sx. Consult placed, Spoken with ED 


- Serial Abd. Exams


- Coags, Type & Screen





2.) Hepatic Masses/Lymphadenopathy


- Oncology Eval


- Needs CT Chest/MRI Liver





3.) Hyponatremia


- Serum/Urine OSm


- Recheck





4.) Hyperkalemia


- Mild 


- Trend





5.) Microcytic Anemia


- Fe Studies





6.) Systolic Ejection Murmur


- Echo





7.) DVT 


- SCDS





Place in Med-Sx

## 2018-05-08 ENCOUNTER — APPOINTMENT (OUTPATIENT)
Dept: CARDIOLOGY | Facility: CLINIC | Age: 83
End: 2018-05-08

## 2018-05-08 LAB
ALBUMIN SERPL-MCNC: 2.3 G/DL (ref 3.4–5)
ALBUMIN SERPL-MCNC: 2.5 G/DL (ref 3.4–5)
ALP SERPL-CCNC: 130 U/L (ref 45–117)
ALP SERPL-CCNC: 131 U/L (ref 45–117)
ALT SERPL-CCNC: 40 U/L (ref 12–78)
ALT SERPL-CCNC: 42 U/L (ref 12–78)
ANION GAP SERPL CALC-SCNC: 14 MMOL/L (ref 8–16)
ANION GAP SERPL CALC-SCNC: 5 MMOL/L (ref 8–16)
ANION GAP SERPL CALC-SCNC: 9 MMOL/L (ref 8–16)
APPEARANCE UR: (no result)
AST SERPL-CCNC: 47 U/L (ref 15–37)
AST SERPL-CCNC: 49 U/L (ref 15–37)
BILIRUB SERPL-MCNC: 0.7 MG/DL (ref 0.2–1)
BILIRUB SERPL-MCNC: 0.8 MG/DL (ref 0.2–1)
BILIRUB UR STRIP.AUTO-MCNC: NEGATIVE MG/DL
BUN SERPL-MCNC: 24 MG/DL (ref 7–18)
BUN SERPL-MCNC: 32 MG/DL (ref 7–18)
BUN SERPL-MCNC: 34 MG/DL (ref 7–18)
CALCIUM SERPL-MCNC: 7.1 MG/DL (ref 8.5–10.1)
CALCIUM SERPL-MCNC: 7.1 MG/DL (ref 8.5–10.1)
CALCIUM SERPL-MCNC: 7.5 MG/DL (ref 8.5–10.1)
CHLORIDE SERPL-SCNC: 101 MMOL/L (ref 98–107)
CHLORIDE SERPL-SCNC: 104 MMOL/L (ref 98–107)
CHLORIDE SERPL-SCNC: 106 MMOL/L (ref 98–107)
CO2 SERPL-SCNC: 23 MMOL/L (ref 21–32)
CO2 SERPL-SCNC: 24 MMOL/L (ref 21–32)
CO2 SERPL-SCNC: 28 MMOL/L (ref 21–32)
COLOR UR: YELLOW
CREAT SERPL-MCNC: 0.9 MG/DL (ref 0.7–1.3)
CREAT SERPL-MCNC: 0.9 MG/DL (ref 0.7–1.3)
CREAT SERPL-MCNC: 1.1 MG/DL (ref 0.7–1.3)
DEPRECATED RDW RBC AUTO: 16.8 % (ref 11.9–15.9)
GLUCOSE SERPL-MCNC: 108 MG/DL (ref 74–106)
GLUCOSE SERPL-MCNC: 94 MG/DL (ref 74–106)
GLUCOSE SERPL-MCNC: 96 MG/DL (ref 74–106)
HCT VFR BLD CALC: 27.5 % (ref 35.4–49)
HGB BLD-MCNC: 9 GM/DL (ref 11.7–16.9)
INR BLD: 1.16 (ref 0.82–1.09)
KETONES UR QL STRIP: (no result)
LEUKOCYTE ESTERASE UR QL STRIP.AUTO: NEGATIVE
MAGNESIUM SERPL-MCNC: 2.2 MG/DL (ref 1.8–2.4)
MAGNESIUM SERPL-MCNC: 2.2 MG/DL (ref 1.8–2.4)
MCH RBC QN AUTO: 26.1 PG (ref 25.7–33.7)
MCHC RBC AUTO-ENTMCNC: 32.9 G/DL (ref 32–35.9)
MCV RBC: 79.3 FL (ref 80–96)
MUCOUS THREADS URNS QL MICRO: (no result)
NITRITE UR QL STRIP: NEGATIVE
PH UR: 5 [PH] (ref 5–8)
PHOSPHATE SERPL-MCNC: 3.4 MG/DL (ref 2.5–4.9)
PHOSPHATE SERPL-MCNC: 4.2 MG/DL (ref 2.5–4.9)
PLATELET # BLD AUTO: 240 K/MM3 (ref 134–434)
PMV BLD: 7.9 FL (ref 7.5–11.1)
POTASSIUM SERPLBLD-SCNC: 4.1 MMOL/L (ref 3.5–5.1)
POTASSIUM SERPLBLD-SCNC: 4.2 MMOL/L (ref 3.5–5.1)
POTASSIUM SERPLBLD-SCNC: 4.3 MMOL/L (ref 3.5–5.1)
PROT SERPL-MCNC: 5.7 G/DL (ref 6.4–8.2)
PROT SERPL-MCNC: 6 G/DL (ref 6.4–8.2)
PROT UR QL STRIP: NEGATIVE
PROT UR QL STRIP: NEGATIVE
PT PNL PPP: 13.1 SEC (ref 9.7–13)
RBC # BLD AUTO: 3.47 M/MM3 (ref 4–5.6)
SODIUM SERPL-SCNC: 137 MMOL/L (ref 136–145)
SODIUM SERPL-SCNC: 138 MMOL/L (ref 136–145)
SODIUM SERPL-SCNC: 139 MMOL/L (ref 136–145)
SP GR UR: 1.03 (ref 1–1.03)
UROBILINOGEN UR STRIP-MCNC: NEGATIVE MG/DL (ref 0.2–1)
WBC # BLD AUTO: 8.4 K/MM3 (ref 4–10)

## 2018-05-08 PROCEDURE — 0YU50JZ SUPPLEMENT RIGHT INGUINAL REGION WITH SYNTHETIC SUBSTITUTE, OPEN APPROACH: ICD-10-PCS | Performed by: SURGERY

## 2018-05-08 RX ADMIN — HEPARIN SODIUM SCH UNIT: 5000 INJECTION, SOLUTION INTRAVENOUS; SUBCUTANEOUS at 00:16

## 2018-05-08 RX ADMIN — HEPARIN SODIUM SCH UNIT: 5000 INJECTION, SOLUTION INTRAVENOUS; SUBCUTANEOUS at 22:24

## 2018-05-08 RX ADMIN — HEPARIN SODIUM SCH UNIT: 5000 INJECTION, SOLUTION INTRAVENOUS; SUBCUTANEOUS at 05:58

## 2018-05-08 RX ADMIN — ATORVASTATIN CALCIUM SCH MG: 20 TABLET, FILM COATED ORAL at 22:24

## 2018-05-08 NOTE — OP
Operative Note





- Note:


Operative Date: 05/08/18


Pre-Operative Diagnosis: incarcerated right inguinal hernia


Operation: Right incarcerated inguinal hernia repair with mesh


Post-Operative Diagnosis: Same as Pre-op


Surgeon: Nikko Olmstead


Assistant: Mary Calvillo


Anesthesiologist/CRNA: Mayra Marcano


Anesthesia: General


Specimens Removed: hernia sack


Estimated Blood Loss (mls): 20


Drains, Volume Out (mls): 175 (cowart)


Fluid Volume Replaced (mls): 700


Operative Report Dictated: Yes

## 2018-05-08 NOTE — PN
Teaching Attending Note


Name of Resident: Ej Pzoo





ATTENDING PHYSICIAN STATEMENT





I saw and evaluated the patient.


I reviewed the resident's note and discussed the case with the resident.


I agree with the resident's findings and plan as documented.








SUBJECTIVE:


No fever or chills . No abd pain when seen at 11 am . did nto pass gas. No N/V 





OBJECTIVE:


NAD . AAOx3 


Cv; RRR, 3/6 Sm at LUSB and to a minimal extent RUSB with extension to carotid. 

3/6 SM at apex with radiatio to axilla 


Lungs: R base crackles 


Ext: no edema , no erythema 


Abd: soft, slightly distended. mild discomfort in R inguinal area with no 

hernia felt ( reduced earlier by Dr. venegas ) . L inguinal hernia filing the L 

hemiscrotum with bowel sounds heard in henia sac


no skin changes over henia 








ASSESSMENT AND PLAN:





82 y/o man with h/o chronic back pain, HTN, L inguinal hernia, HLP and 

depression who presented with  abd pain and distentin and was found to have SBO 

a tthe level of R inguinal hernia 





1- SBO 2/2 R inguinal hernia.  declined NGT. pain is controlled. 


d/W Dr. Venegas, surgical intervention is needed urgently pending OR 

availability 


- pain control 


- IVF ( switch to D5w ) 


- NPO 


- Echo with Mod-severe AS, and mild MR, TR. due to the urgency of the surgery 

no surgical intervention should be delayed  for cardiac procedures. he is at 

high risk of jabari-op cardiac complications given his AS


- . Avoid any QTC prolonging agents. d/w Dr. venegas 





2- Hyponatremia: likely due to hypovolemia and GI loss. corrected rapidly with 

NS overnight. 


- switch IVF to D5W and repeat NA level


- goal is to keep Na around same number today . will adjust IVF type/rate 

accordingly . if stable  switch  to NS tomorrow 








3- Liver masses o n CT scan : explained to pt and family possibility of Mets. 

also lung lesion seen . 


they opted  for no further w/u as inpt . even staging was declined.


- f/u as out PT 





4-  Prolonged QTC.  503. 


- decrease celexa to 10 from 20 daily . can't stop abruptly


- avoid any Qtc prolonging agents 


- No zofran 





5- HTN: hold norvasc and labetalol. use IV meds if needed or po with sips after 

surgery 








Dispo : OC

## 2018-05-08 NOTE — EKG
Test Reason : 

Blood Pressure : ***/*** mmHG

Vent. Rate : 084 BPM     Atrial Rate : 084 BPM

   P-R Int : 148 ms          QRS Dur : 142 ms

    QT Int : 426 ms       P-R-T Axes : 036 -19 026 degrees

   QTc Int : 503 ms

 

NORMAL SINUS RHYTHM

RIGHT BUNDLE BRANCH BLOCK

MINIMAL VOLTAGE CRITERIA FOR LVH, MAY BE NORMAL VARIANT

ABNORMAL ECG

NO PREVIOUS ECGS AVAILABLE

Confirmed by MD Ricky, Garth ([id number]) on 5/8/2018 9:56:07 AM

 

Referred By:  LYLA           Confirmed By:Garth García MD

## 2018-05-08 NOTE — PN
Physical Exam: 


SUBJECTIVE: Briefly, 84yo M history of HTN, OA, hepatitis, and cholecystectomy (

2000) who presented with 3 days worth of nausea, bilious/nonbloody vomiting and 

abdominal pain. Pt reports not being able to eat or hold down fluids. He has 

been able to pass gas, however his last BM was about 24hrs ago with hard 

consistency.





Currently pt has mild abdominal discomfort, but denies any shortness of breath, 

chest pain/discomfort, palpitations, fever, chills, continued episodes of 

vomiting.





OBJECTIVE:





 Vital Signs











 Period  Temp  Pulse  Resp  BP Sys/Matos  Pulse Ox


 


 Last 24 Hr  98.0 F-99.5 F  76-96  18-29  112-132/47-65  94-97











GENERAL: NAD, awake, alert, and fully oriented, laying in bed.


HEENT: EOMI, TYRA, sclera anicteric, moist mucosa


LUNGS: CTA bilaterally, no wheezes, no crackles, no accessory muscle use. 


HEART: RRR, S1, S2 with 3/6 systolic murmur at LUSB and radiation to carotids. 2

/6 systolic murmur at apex


ABDOMEN: Soft, mild distention, hypoactive BS, nontender, L large inguinal 

hernia noted, R small nonreducible inguinal hernia w/o overlying skin changes 

noted.


EXTREMITIES: 2+ Dp pulses, warm, no edema. 


PSYCH: Normal mood, normal affect.


SKIN: Warm, dry, no rashes or lesions noted














 Laboratory Results - last 24 hr











  05/07/18 05/07/18 05/07/18





  23:40 23:40 23:40


 


WBC  10.0  


 


RBC  3.55 L  


 


Hgb  9.3 L  


 


Hct  28.1 L  


 


MCV  79.2 L  


 


MCH  26.3  


 


MCHC  33.2  


 


RDW  16.8 H  


 


Plt Count  271  


 


MPV  7.8  


 


PT with INR   13.10 H 


 


INR   1.16 H 


 


Sodium    138


 


Potassium    4.1


 


Chloride    101


 


Carbon Dioxide    23


 


Anion Gap    14


 


BUN    34 H


 


Creatinine    1.1


 


Creat Clearance w eGFR    > 60


 


Random Glucose    108 H


 


Calcium    7.5 L


 


Phosphorus    4.2


 


Magnesium    2.2


 


Total Bilirubin    0.8


 


AST    49 H


 


ALT    40


 


Alkaline Phosphatase    131 H


 


Total Protein    6.0 L


 


Albumin    2.5 L


 


Urine Color   


 


Urine Appearance   


 


Urine pH   


 


Ur Specific Gravity   


 


Urine Protein   


 


Urine Glucose (UA)   


 


Urine Ketones   


 


Urine Blood   


 


Urine Nitrite   


 


Urine Bilirubin   


 


Urine Urobilinogen   


 


Ur Leukocyte Esterase   


 


Urine WBC (Auto)   


 


Urine RBC (Auto)   


 


Urine Mucus   


 


Ur Random Sodium   


 


Ur Random Potassium   


 


Ur Random Chloride   


 


Blood Type   


 


Antibody Screen   














  05/07/18 05/08/18 05/08/18





  23:40 02:00 02:00


 


WBC   


 


RBC   


 


Hgb   


 


Hct   


 


MCV   


 


MCH   


 


MCHC   


 


RDW   


 


Plt Count   


 


MPV   


 


PT with INR   


 


INR   


 


Sodium   


 


Potassium   


 


Chloride   


 


Carbon Dioxide   


 


Anion Gap   


 


BUN   


 


Creatinine   


 


Creat Clearance w eGFR   


 


Random Glucose   


 


Calcium   


 


Phosphorus   


 


Magnesium   


 


Total Bilirubin   


 


AST   


 


ALT   


 


Alkaline Phosphatase   


 


Total Protein   


 


Albumin   


 


Urine Color    Yellow


 


Urine Appearance    Cloudy


 


Urine pH    5.0


 


Ur Specific Gravity    1.035


 


Urine Protein    Negative


 


Urine Glucose (UA)    Negative


 


Urine Ketones    1+ H


 


Urine Blood    1+ H


 


Urine Nitrite    Negative


 


Urine Bilirubin    Negative


 


Urine Urobilinogen    Negative


 


Ur Leukocyte Esterase    Negative


 


Urine WBC (Auto)    3


 


Urine RBC (Auto)    9


 


Urine Mucus    Rare


 


Ur Random Sodium   41 


 


Ur Random Potassium   39.5 


 


Ur Random Chloride   22 


 


Blood Type  A POSITIVE  


 


Antibody Screen  Negative  














  05/08/18 05/08/18 05/08/18





  07:45 07:45 22:00


 


WBC  8.4  


 


RBC  3.47 L  


 


Hgb  9.0 L  


 


Hct  27.5 L  


 


MCV  79.3 L  


 


MCH  26.1  


 


MCHC  32.9  


 


RDW  16.8 H  


 


Plt Count  240  


 


MPV  7.9  


 


PT with INR   


 


INR   


 


Sodium   139  137


 


Potassium   4.2  4.3


 


Chloride   106  104


 


Carbon Dioxide   24  28


 


Anion Gap   9  5 L


 


BUN   32 H  24 H D


 


Creatinine   0.9  0.9


 


Creat Clearance w eGFR   > 60 


 


Random Glucose   94  96


 


Calcium   7.1 L  7.1 L


 


Phosphorus   3.4 


 


Magnesium   2.2 


 


Total Bilirubin   0.7 


 


AST   47 H 


 


ALT   42 


 


Alkaline Phosphatase   130 H 


 


Total Protein   5.7 L 


 


Albumin   2.3 L 


 


Urine Color   


 


Urine Appearance   


 


Urine pH   


 


Ur Specific Gravity   


 


Urine Protein   


 


Urine Glucose (UA)   


 


Urine Ketones   


 


Urine Blood   


 


Urine Nitrite   


 


Urine Bilirubin   


 


Urine Urobilinogen   


 


Ur Leukocyte Esterase   


 


Urine WBC (Auto)   


 


Urine RBC (Auto)   


 


Urine Mucus   


 


Ur Random Sodium   


 


Ur Random Potassium   


 


Ur Random Chloride   


 


Blood Type   


 


Antibody Screen   














  05/08/18





  22:00


 


WBC 


 


RBC 


 


Hgb 


 


Hct 


 


MCV 


 


MCH 


 


MCHC 


 


RDW 


 


Plt Count 


 


MPV 


 


PT with INR 


 


INR 


 


Sodium  138


 


Potassium 


 


Chloride 


 


Carbon Dioxide 


 


Anion Gap 


 


BUN 


 


Creatinine 


 


Creat Clearance w eGFR 


 


Random Glucose 


 


Calcium 


 


Phosphorus 


 


Magnesium 


 


Total Bilirubin 


 


AST 


 


ALT 


 


Alkaline Phosphatase 


 


Total Protein 


 


Albumin 


 


Urine Color 


 


Urine Appearance 


 


Urine pH 


 


Ur Specific Gravity 


 


Urine Protein 


 


Urine Glucose (UA) 


 


Urine Ketones 


 


Urine Blood 


 


Urine Nitrite 


 


Urine Bilirubin 


 


Urine Urobilinogen 


 


Ur Leukocyte Esterase 


 


Urine WBC (Auto) 


 


Urine RBC (Auto) 


 


Urine Mucus 


 


Ur Random Sodium 


 


Ur Random Potassium 


 


Ur Random Chloride 


 


Blood Type 


 


Antibody Screen 








Active Medications











Generic Name Dose Route Start Last Admin





  Trade Name Freq  PRN Reason Stop Dose Admin


 


Acetaminophen  650 mg  05/08/18 21:07  





  Tylenol -  PO   





  Q4H PRN   





  FEVER   


 


Atorvastatin Calcium  20 mg  05/08/18 22:00  05/08/18 22:24





  Lipitor -  PO   20 mg





  HS ABRAN   Administration


 


Citalopram Hydrobromide  10 mg  05/09/18 10:00  





  Celexa -  PO   





  DAILY ABRAN   


 


Heparin Sodium (Porcine)  5,000 unit  05/08/18 22:00  05/08/18 22:24





  Heparin -  SQ   5,000 unit





  TID ABRAN   Administration


 


Dextrose/Sodium Chloride  1,000 mls @ 75 mls/hr  05/08/18 19:15  05/08/18 22:23





  D5-1/2ns -  IV   75 mls/hr





  ASDIR ABRAN   Administration


 


Morphine Sulfate  4 mg  05/08/18 19:13  





  Morphine Sulfate  IVPUSH   





  Q6H PRN   





  PAIN LEVEL 7 - 10   


 


Ondansetron HCl  4 mg  05/08/18 19:04  





  Zofran Injection  IVPUSH   





  Q6H PRN   





  NAUSEA AND/OR VOMITING   


 


Oxycodone HCl  5 mg  05/08/18 19:03  





  Roxicodone -  PO   





  Q6H PRN   





  PAIN LEVEL 1 - 3   


 


Oxycodone HCl  10 mg  05/08/18 19:04  





  Roxicodone -  PO   





  Q6H PRN   





  PAIN LEVEL 4 - 6   











ASSESSMENT/PLAN:


1) SBO 2/2 R inguinal hernia


   --Reduced by Dr. Olmstead immediately after exam


   --Pt recommended for OR


      --NPO currently


      --Surgical intervention is urgent and should not be delayed


      --Echo cardiogram showing mod-severe aortic stenosis, mild MR and mild TR


      --Pt is high risk for an intermediate risk procedure


   --Pt tolerating clear liquid diet effectively 


   --Continue pain control with Roxicodone 5mg PO q6h PRN


      --will discontinue 10mg and Morphine due to lack of use from patient


   --Continue to monitor for flatus and BM


   --Dr. Olmstead on the case





2) Hyponatremia


   --2/2 to dehydration and losses via GI tract


   --Switching to D5W due to rapid correction with NS





3) Liver mass


   --Found on CT scan with suspect metastasis


   --CXR with suspect RUL nodule


   --Discussed with pt and family previously, however would not like to discuss 

it further. Event staging previous declined





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


   --Decreased to celexa 10mg qDaily


   --Avoid Qtc prolonging medications





5) H/o HTN


   --Labetalol HCL 200mg PO BID





FEN:


   --NPO for surgery


   --Hyponatremia as above


   --D5W





PPX:


   DVT - Heparin SQ


   GI - none indicated currently





Dispo: OR today for R inguinal hernia repair


Case discussed with Dr. Jareth Pozo, DO - IM PGY-1





Visit type





- Emergency Visit


Emergency Visit: No





- New Patient


This patient is new to me today: No





- Critical Care


Critical Care patient: No

## 2018-05-08 NOTE — SURG
Surgery First Assist Note


First Assist: Mary Calvillo PA-C


Date of Service: 05/08/18


Diagnosis: 





right incarcerated inguinal hernia


Procedure: 





right incarcerated inguinal hernia repair with mesh


I was present for the entirety of the operative procedure. For further detail, 

please refer to operative report.








Visit type





- Case Type


Case Type: ED Admission





- Emergency


Emergency Visit: Yes


ED Registration Date: 05/07/18


Care time: The patient presented to the Emergency Department on the above date 

and was hospitalized for further evaluation of their emergent condition.





- New patient


This patient is new to me today: Yes


Date on this admission: 05/08/18

## 2018-05-08 NOTE — CONSULT
- Consultation


REQUESTING PROVIDER: HIMA Sharma MD





CONSULT REQUEST: We have been asked to surgically evaluate this patient for 

management of an incarcerated right inguinal hernia.





PCP:Amol Templeton





HISTORY OF PRESENT ILLNESS: ALLEGRA who is an 84 y/o male w/ a known Left IH  who 

presented w/right groin pain and nausea and vomiting of sudden on set starting 

24 hours ago w/associated obstipation; he went to the ER at [facility] and was xferred 

here for further care aftre initial evaluation; since xfer here he has been 

passing flatus and had liquid bowel movements; he still has some vague 

abdominal pain and gas; he has NOC.





PMHx:   HTN; HLD;       





PSHx:   lap patricia     


 Home Medications











 Medication  Instructions  Recorded


 


Amlodipine Besylate [Norvasc -] 10 mg PO DAILY 05/07/18


 


Aspirin [Aspirin EC] 81 mg PO DAILY 05/07/18


 


Citalopram Hydrobromide [Celexa -] 20 mg PO DAILY 05/07/18


 


Labetalol HCl [Normodyne -] 300 mg PO BID 05/07/18


 


Oxycodone HCl/Acetaminophen 1 tab PO Q6H 05/07/18





[Percocet 5-325 mg Tablet]  


 


Simvastatin [Zocor -] 40 mg PO DAILY 05/07/18








 Allergies











Allergy/AdvReac Type Severity Reaction Status Date / Time


 


No Known Allergies Allergy   Unverified 05/07/18 14:37








PHYSICAL EXAM:


GENERAL: Awake, alert, and fully oriented, in no acute distress.


HEAD: Normal with no signs of trauma.


EYES: sclera anicteric, conjunctiva clear.


NECK: Normal ROM, supple without lymphadenopathy, JVD, or masses.


ABDOMEN: Soft, slight tenderness, minimally distended and tympanitic, sluggish 

bowel sounds, no guarding, no rebound, no masses.  No organomegaly. Reducible 

right inguinal hernia; chronically incarcerated left inguinoscrotal hernia; 

testicle identified on the right difficult to ascertain on the left


MUSCULOSKELETAL: Normal ROM at all joints. No bony deformities or tenderness. 

No CVA tenderness.


UPPER EXTREMITIES: 2+ pulses, warm, well-perfused. No cyanosis. Cap refill <2 

seconds. No peripheral edema.


LOWER EXTREMITIES: 2+ pulses, warm, well-perfused. No calf tenderness. No 

peripheral edema. 


NEUROLOGICAL: Normal speech, gait not observed.


PSYCH: Cooperative. Good eye contact. Appropriate mood and affect.


SKIN: Warm, dry, normal turgor, no rashes or lesions noted.


 Vital Signs











Temperature  99.2 F   05/08/18 05:00


 


Pulse Rate  88   05/08/18 05:00


 


Respiratory Rate  20   05/07/18 23:47


 


Blood Pressure  126/59   05/08/18 05:00


 


O2 Sat by Pulse Oximetry (%)  96   05/08/18 06:56








 Lab Results











WBC  8.4 K/mm3 (4.0-10.0)   05/08/18  07:45    


 


RBC  3.47 M/mm3 (4.00-5.60)  L  05/08/18  07:45    


 


Hgb  9.0 GM/dL (11.7-16.9)  L  05/08/18  07:45    


 


Hct  27.5 % (35.4-49)  L  05/08/18  07:45    


 


MCV  79.3 fl (80-96)  L  05/08/18  07:45    


 


MCHC  32.9 g/dl (32.0-35.9)   05/08/18  07:45    


 


RDW  16.8 % (11.9-15.9)  H  05/08/18  07:45    


 


Plt Count  240 K/MM3 (134-434)   05/08/18  07:45    


 


Sodium  138 mmol/L (136-145)   05/07/18  23:40    


 


Potassium  4.1 mmol/L (3.5-5.1)   05/07/18  23:40    


 


Chloride  101 mmol/L ()   05/07/18  23:40    


 


Carbon Dioxide  23 mmol/L (21-32)   05/07/18  23:40    


 


Anion Gap  14  (8-16)   05/07/18  23:40    


 


BUN  34 mg/dL (7-18)  H  05/07/18  23:40    


 


Creatinine  1.1 mg/dL (0.7-1.3)   05/07/18  23:40    


 


Random Glucose  108 mg/dL ()  H  05/07/18  23:40    


 


Calcium  7.5 mg/dL (8.5-10.1)  L  05/07/18  23:40    


 


Blood Type  A POSITIVE   05/07/18  23:40    


 


Antibody Screen  Negative   05/07/18  23:40    


 


INR  1.16  (0.82-1.09)  H  05/07/18  23:40    








CT a/p reviewed-sbo due to incarcerated right inguinal hernia; known left 

chronically incarcerated left inguinal hernia





IMP:sbo due to incarcerated right inguinal hernia; known left chronically 

incarcerated left inguinal hernia.





PLAN: Advise repair of right inguinal hernia with possible mesh; possible small 

bowel resection ; left inguinal hernia is not amenable to repair due to loss of 

domain; r/b/t/a's d/w the patient who will give informed consent and d/w his 

wife as well; possible recurrence discussed as well; other CT scan findings may 

be addressed after surgery.











Nikko Olmstead MD FACS





Visit type





- Case Type


Case Type: ED Admission





- Emergency


Emergency Visit: Yes


ED Registration Date: 05/07/18


Care time: The patient presented to the Emergency Department on the above date 

and was hospitalized for further evaluation of their emergent condition.





- New patient


This patient is new to me today: Yes


Date on this admission: 05/08/18





- Critical Care


Critical Care patient: No

## 2018-05-08 NOTE — MSN
Progress Note (SOAP)





- Subjective


Chief Complaint: 





Bilateral inguinal hernia 


History of Present Illness: 





Patient is a 82 year old male with pertinent past medical hx of htn, 

osteoarthritis, heaptitis (unknown type), and anxiety that presented to the ER 

after 3 days of nausea and vomiting. The patient describes the vomiting 

episodes as bilious, devoid of blood, and occurring at a rate of every two 

hours. While in the ER the patient was found to have a large left sided and 

small right sided inguinal hernia. Today the patient states that he is feeling 

better and describes no nausea, vomiting or abdominal pain since coming to the 

hospital. The patient states that he is not having any symptoms associated with 

his b/l inguinal hernias. The patient is only complaining of new onset diarrhea 

that started at 5am this morning, is watery, and happened twice. The patient 

also stated feeling anxious since being admitted to the hospital. 





ROS: negative for chest pain, SOB, abdominal pain, N/V, fever or chills





ED course: notable for hyperkalemia and hyponatremia treated with N/S. 





- Current Medications


Current Medications: 


Active Medications





Acetaminophen (Tylenol -)  650 mg PO Q4H PRN


   PRN Reason: PAIN


Atorvastatin Calcium (Lipitor -)  20 mg PO HS [facility]


Citalopram Hydrobromide (Celexa -)  20 mg PO DAILY [facility]


   Last Admin: 05/08/18 13:34 Dose:  Not Given


Heparin Sodium (Porcine) (Heparin -)  5,000 unit SQ TID [facility]


   Last Admin: 05/08/18 05:58 Dose:  5,000 unit


Dextrose (D5w -)  1,000 mls @ 50 mls/hr IV Q20H [facility]











- Objective


Vital Signs: 


 Vital Signs











Temperature  99.2 F   05/08/18 05:00


 


Pulse Rate  96 H  05/08/18 09:00


 


Respiratory Rate  20   05/08/18 09:00


 


Blood Pressure  132/59   05/08/18 09:00


 


O2 Sat by Pulse Oximetry (%)  96   05/08/18 06:56











Constitutional: Yes: Well Nourished, No Distress, Anxious


Eyes: Yes: EOM Intact, Other (pupils small and minimally reactive )


HENT: Yes: Atraumatic, Normocephalic, Thrush


Neck: Yes: Supple, Trachea Midline


Cardiovascular: Yes: Regular Rate and Rhythm, S1 (3/6 systolic murmur that 

radiates to the right carotid )


Respiratory: Yes: Other (crackles heard bilaterally in the lower lung fields )


Gastrointestinal: Yes: Normal Bowel Sounds, Soft.  No: Tenderness


Genitourinary: Yes: Other (large left inguinal henria )


Musculoskeletal: Yes: Back Pain


Peripheral Pulses: Left Doralis Pedis: 2+, Right Dorsalis Pedis: 2+


Edema: No


Neurological: Yes: Cran Nerves II-XII Intact


...Motor Strength: Yes: WNL





Labs


Lab Results: 


 CBC, BMP





 05/08/18 07:45 





 05/08/18 07:45 











Problem List





- Problems


(1) Bowel obstruction


Code(s): K56.609 - UNSP INTESTNL OBST, UNSP AS TO PARTIAL VERSUS COMPLETE OBST 

  


Qualifiers: 


   Intestinal obstruction type: unspecified   Intestinal obstruction extent: 

complete   Qualified Code(s): K56.601 - Complete intestinal obstruction, 

unspecified as to cause   





(2) Inguinal hernia


Code(s): K40.90 - UNIL INGUINAL HERNIA, W/O OBST OR GANGR, NOT SPCF AS RECUR   





(3) Liver mass


Code(s): R16.0 - HEPATOMEGALY, NOT ELSEWHERE CLASSIFIED   





(4) Small bowel obstruction


Code(s): K56.609 - UNSP INTESTNL OBST, UNSP AS TO PARTIAL VERSUS COMPLETE OBST 

  





Assessment/Plan





Assessment/Plan: Patient is an 83 year old male with past hx of HTN, 

osteoarthritis, hepatitis (unkown type) and anxiety that presented to the ER 

with 3 days of bilious vomiting and B/L inguinal hernia with right sided bowel 

obstruction. 





# B/L inguinal hernia with right sided bowel obstruction


- small bowel obstruction on the right confirmed by CT on 5/7


- NPO


- Start 1/2 normal saline, to stop rapid rise in sodium


- NG tube is not placed to decompress bowel as per refusal of family members


- General surgeon Dr. Olmstead consulted, described surgery as urgent


- patient has agreed to right inguinal hernia repair with possible small bowel 

resection


- will start morphine PRN for post operative pain 





# Systolic Murmur


- cardiology consult placed


- Echo order to asses cardiac function although not necessary given the urgent 

nature of the surgery 


- Echo showed normal left ventricular size and function and mild to moderate 

aortic stenosis





# Hyponatremia


- corrected with N/S from 128 to 138 in 8hrs


- correction of sodium should be no more than 10 in 24 hours to prevent locked 

in syndrome 


- will change N/S to 1/2 normal saline





# Hyperkalemia


- Resolved from 5.2 to 4.2


- continue to monitor CMP





# Anemia


- low MCV consistent with iron deficiency


- will obtain Iron studies


- keep in mind the possibility of normal or high ferritin due to acute phase 

reaction 





# Liver Lesion


- CT on 5/7 showed lesions suspsicious for metastatic disease


- family does not wish to address this issue at this time 


- GI consult has been canceled





# Thrush


- Hold nystatin until after surgery because of patient NPO status





#DVT prophylaxis 


- Heparin 5000 sub Q TID





Dispo


- admitted to [facility] and will undergoe surgery on right inguinal hernia and 

small bowel

## 2018-05-08 NOTE — CON.CARD
Consult


Consult Specialty:: Cardiology


Referred by:: Hospitalist


Reason for Consultation:: Cardiac clearance





- History of Present Illness


Chief Complaint: Abdominal pain


History of Present Illness: 





Patient is an 83 year old male with underlying history of hypertension, 

hypercholesterolemia, osteoarthritis, hepatitis and bilateral inguinal hernia 

who presents with nausea, vomiting and abdominal pain. He was treated for UTI/

hematuria a week ago for which he was treated with antibiotics.  Abdominal CT 

revealed high grade small bowel obstruction with right inguinal hernia. Surgery 

consultation was called and currently awaits OR. He denies chest pain, 

shortness of breath or palpitations. He denies paroxysmal nocturnal dyspnea or 

orthopnea. He denies fever or chills. He denies headache or lightheadedness. 

Cardiology consultation was called for cardiac clearance. Patient had seen a 

cardiologist affiliated with [facility] in [place]





- History Source


History Provided By: Patient, Family Member, Medical Record


Limitations to Obtaining History: No Limitations





- Past Medical History


Cardio/Vascular: Yes: HTN, Hyperlipdemia


Musculoskeletal: Yes: Osteoarthritis


Additional Medical History: left inguinal hernia





- Past Surgical History


Past Surgical History: Yes: Cholecystectomy





- Alcohol/Substance Use


Hx Alcohol Use: No





- Smoking History


Smoking history: Former smoker


Have you smoked in the past 12 months: No


If you are a former smoker, when did you quit?: 2003





Home Medications





- Allergies


Allergies/Adverse Reactions: 


 Allergies











Allergy/AdvReac Type Severity Reaction Status Date / Time


 


No Known Allergies Allergy   Unverified 05/07/18 14:37














- Home Medications


Home Medications: 


Ambulatory Orders





Amlodipine Besylate [Norvasc -] 10 mg PO DAILY 05/07/18 


Aspirin [Aspirin EC] 81 mg PO DAILY 05/07/18 


Citalopram Hydrobromide [Celexa -] 20 mg PO DAILY 05/07/18 


Labetalol HCl [Normodyne -] 300 mg PO BID 05/07/18 


Oxycodone HCl/Acetaminophen [Percocet 5-325 mg Tablet] 1 tab PO Q6H 05/07/18 


Simvastatin [Zocor -] 40 mg PO DAILY 05/07/18 











Family Disease History





- Family Disease History


Family History: Denies





Review of Systems





- Review of Systems


Constitutional: denies: Chills, Fever


Cardiovascular: denies: Chest Pain, Palpitations, Shortness of Breath


Respiratory: denies: Cough, Hemoptysis, Orthopnea, PND, SOB, SOB on Exertion


Gastrointestinal: reports: Abdominal Pain, Nausea, Vomiting.  denies: 

Constipation, Diarrhea, Melena, Rectal Bleeding, Vomiting Blood


Genitourinary: denies: Dysuria, Hematuria


Musculoskeletal: reports: Joint Pain


Neurological: denies: Dizziness, Headache, Seizure, Syncope


Vital Signs: 


 Vital Signs











Temperature  99.2 F   05/08/18 05:00


 


Pulse Rate  96 H  05/08/18 09:00


 


Respiratory Rate  20   05/08/18 09:00


 


Blood Pressure  132/59   05/08/18 09:00


 


O2 Sat by Pulse Oximetry (%)  96   05/08/18 06:56











Eyes: Yes: PERRL


HENT: Yes: Atraumatic


Neck: Yes: Supple


Respiratory: Yes: CTA Bilaterally


Gastrointestinal: Yes: Soft, Hypoactive Bowel Sounds, Tenderness


Cardiovascular: Yes: Regular Rate and Rhythm


JVD: No


Carotid Bruit: No


PMI: Non-Displaced


Heart Sounds: Yes: S1, S2.  No: Gallop


Edema: No





- Other Data


Labs, Other Data: 


 CBC, BMP





 05/08/18 07:45 





 05/08/18 07:45 





 INR, PTT











INR  1.16  (0.82-1.09)  H  05/07/18  23:40    








 Troponin, BNP











  05/07/18





  14:30


 


Troponin I  < 0.03








 


 Laboratory Results - last 24 hr











  05/07/18 05/07/18 05/07/18





  14:07 14:30 14:30


 


WBC   12.2 H 


 


RBC   4.27 


 


Hgb   11.1 L 


 


Hct   33.7 L 


 


MCV   78.9 L 


 


MCH   26.0 


 


MCHC   32.9 


 


RDW   15.3 


 


Plt Count   359 


 


MPV   7.8 


 


Neutrophils %   No Result Required. 


 


Neutrophils % (Manual)   85.0 H 


 


Band Neutrophils %   11.0 H 


 


Lymphocytes %   No Result Required. 


 


Lymphocytes % (Manual)   2.0 L 


 


Monocytes % (Manual)   2 L 


 


Platelet Estimate   Adequate 


 


PT with INR    13.3 H


 


INR    1.19


 


PTT (Actin FS)    24.2 L


 


Sodium   


 


Potassium   


 


Chloride   


 


Carbon Dioxide   


 


Anion Gap   


 


BUN   


 


Creatinine   


 


Creat Clearance w eGFR   


 


Random Glucose   


 


Lactic Acid   


 


Calcium   


 


Phosphorus   


 


Magnesium   


 


Total Bilirubin   


 


AST   


 


ALT   


 


Alkaline Phosphatase   


 


Troponin I   


 


Total Protein   


 


Albumin   


 


Lipase   


 


Urine Color  Yellow  


 


Urine Appearance  Clear  


 


Urine pH  5.0  


 


Ur Specific Gravity  >= 1.030 H  


 


Urine Protein  Trace  


 


Urine Glucose (UA)  Negative  


 


Urine Ketones  2+ H  


 


Urine Blood  1+ H  


 


Urine Nitrite  Negative  


 


Urine Bilirubin  2+ H  


 


Urine Urobilinogen  0.2  


 


Ur Leukocyte Esterase  Negative  


 


Urine WBC (Auto)   


 


Urine RBC (Auto)   


 


Urine RBC  8-10  


 


Urine WBC  0-3  


 


Ur Epithelial Cells  Rare  


 


Urine Bacteria  Rare  


 


Urine Mucus   


 


Ur Random Sodium   


 


Ur Random Potassium   


 


Ur Random Chloride   


 


Blood Type   


 


Antibody Screen   














  05/07/18 05/07/18 05/07/18





  14:30 14:30 14:30


 


WBC   


 


RBC   


 


Hgb   


 


Hct   


 


MCV   


 


MCH   


 


MCHC   


 


RDW   


 


Plt Count   


 


MPV   


 


Neutrophils %   


 


Neutrophils % (Manual)   


 


Band Neutrophils %   


 


Lymphocytes %   


 


Lymphocytes % (Manual)   


 


Monocytes % (Manual)   


 


Platelet Estimate   


 


PT with INR   


 


INR   


 


PTT (Actin FS)   


 


Sodium  128 L  


 


Potassium  5.2 H  


 


Chloride  93 L  


 


Carbon Dioxide  23  


 


Anion Gap  12  


 


BUN  33 H  


 


Creatinine  1.1  


 


Creat Clearance w eGFR  > 60  


 


Random Glucose  142 H  


 


Lactic Acid   


 


Calcium  8.4  


 


Phosphorus   


 


Magnesium   


 


Total Bilirubin  0.9  


 


AST  33  


 


ALT  29  


 


Alkaline Phosphatase  129 H  


 


Troponin I    < 0.03


 


Total Protein  6.9  


 


Albumin  3.0 L  


 


Lipase   53 L 


 


Urine Color   


 


Urine Appearance   


 


Urine pH   


 


Ur Specific Gravity   


 


Urine Protein   


 


Urine Glucose (UA)   


 


Urine Ketones   


 


Urine Blood   


 


Urine Nitrite   


 


Urine Bilirubin   


 


Urine Urobilinogen   


 


Ur Leukocyte Esterase   


 


Urine WBC (Auto)   


 


Urine RBC (Auto)   


 


Urine RBC   


 


Urine WBC   


 


Ur Epithelial Cells   


 


Urine Bacteria   


 


Urine Mucus   


 


Ur Random Sodium   


 


Ur Random Potassium   


 


Ur Random Chloride   


 


Blood Type   


 


Antibody Screen   














  05/07/18 05/07/18 05/07/18





  17:51 23:40 23:40


 


WBC   10.0 


 


RBC   3.55 L 


 


Hgb   9.3 L 


 


Hct   28.1 L 


 


MCV   79.2 L 


 


MCH   26.3 


 


MCHC   33.2 


 


RDW   16.8 H 


 


Plt Count   271 


 


MPV   7.8 


 


Neutrophils %   


 


Neutrophils % (Manual)   


 


Band Neutrophils %   


 


Lymphocytes %   


 


Lymphocytes % (Manual)   


 


Monocytes % (Manual)   


 


Platelet Estimate   


 


PT with INR    13.10 H


 


INR    1.16 H


 


PTT (Actin FS)   


 


Sodium   


 


Potassium   


 


Chloride   


 


Carbon Dioxide   


 


Anion Gap   


 


BUN   


 


Creatinine   


 


Creat Clearance w eGFR   


 


Random Glucose   


 


Lactic Acid  1.1  


 


Calcium   


 


Phosphorus   


 


Magnesium   


 


Total Bilirubin   


 


AST   


 


ALT   


 


Alkaline Phosphatase   


 


Troponin I   


 


Total Protein   


 


Albumin   


 


Lipase   


 


Urine Color   


 


Urine Appearance   


 


Urine pH   


 


Ur Specific Gravity   


 


Urine Protein   


 


Urine Glucose (UA)   


 


Urine Ketones   


 


Urine Blood   


 


Urine Nitrite   


 


Urine Bilirubin   


 


Urine Urobilinogen   


 


Ur Leukocyte Esterase   


 


Urine WBC (Auto)   


 


Urine RBC (Auto)   


 


Urine RBC   


 


Urine WBC   


 


Ur Epithelial Cells   


 


Urine Bacteria   


 


Urine Mucus   


 


Ur Random Sodium   


 


Ur Random Potassium   


 


Ur Random Chloride   


 


Blood Type   


 


Antibody Screen   














  05/07/18 05/07/18 05/08/18





  23:40 23:40 02:00


 


WBC   


 


RBC   


 


Hgb   


 


Hct   


 


MCV   


 


MCH   


 


MCHC   


 


RDW   


 


Plt Count   


 


MPV   


 


Neutrophils %   


 


Neutrophils % (Manual)   


 


Band Neutrophils %   


 


Lymphocytes %   


 


Lymphocytes % (Manual)   


 


Monocytes % (Manual)   


 


Platelet Estimate   


 


PT with INR   


 


INR   


 


PTT (Actin FS)   


 


Sodium  138  


 


Potassium  4.1  


 


Chloride  101  


 


Carbon Dioxide  23  


 


Anion Gap  14  


 


BUN  34 H  


 


Creatinine  1.1  


 


Creat Clearance w eGFR  > 60  


 


Random Glucose  108 H  


 


Lactic Acid   


 


Calcium  7.5 L  


 


Phosphorus  4.2  


 


Magnesium  2.2  


 


Total Bilirubin  0.8  


 


AST  49 H  


 


ALT  40  


 


Alkaline Phosphatase  131 H  


 


Troponin I   


 


Total Protein  6.0 L  


 


Albumin  2.5 L  


 


Lipase   


 


Urine Color   


 


Urine Appearance   


 


Urine pH   


 


Ur Specific Gravity   


 


Urine Protein   


 


Urine Glucose (UA)   


 


Urine Ketones   


 


Urine Blood   


 


Urine Nitrite   


 


Urine Bilirubin   


 


Urine Urobilinogen   


 


Ur Leukocyte Esterase   


 


Urine WBC (Auto)   


 


Urine RBC (Auto)   


 


Urine RBC   


 


Urine WBC   


 


Ur Epithelial Cells   


 


Urine Bacteria   


 


Urine Mucus   


 


Ur Random Sodium    41


 


Ur Random Potassium    39.5


 


Ur Random Chloride    22


 


Blood Type   A POSITIVE 


 


Antibody Screen   Negative 














  05/08/18 05/08/18 05/08/18





  02:00 07:45 07:45


 


WBC   8.4 


 


RBC   3.47 L 


 


Hgb   9.0 L 


 


Hct   27.5 L 


 


MCV   79.3 L 


 


MCH   26.1 


 


MCHC   32.9 


 


RDW   16.8 H 


 


Plt Count   240 


 


MPV   7.9 


 


Neutrophils %   


 


Neutrophils % (Manual)   


 


Band Neutrophils %   


 


Lymphocytes %   


 


Lymphocytes % (Manual)   


 


Monocytes % (Manual)   


 


Platelet Estimate   


 


PT with INR   


 


INR   


 


PTT (Actin FS)   


 


Sodium    139


 


Potassium    4.2


 


Chloride    106


 


Carbon Dioxide    24


 


Anion Gap    9


 


BUN    32 H


 


Creatinine    0.9


 


Creat Clearance w eGFR    > 60


 


Random Glucose    94


 


Lactic Acid   


 


Calcium    7.1 L


 


Phosphorus    3.4


 


Magnesium    2.2


 


Total Bilirubin    0.7


 


AST    47 H


 


ALT    42


 


Alkaline Phosphatase    130 H


 


Troponin I   


 


Total Protein    5.7 L


 


Albumin    2.3 L


 


Lipase   


 


Urine Color  Yellow  


 


Urine Appearance  Cloudy  


 


Urine pH  5.0  


 


Ur Specific Gravity  1.035  


 


Urine Protein  Negative  


 


Urine Glucose (UA)  Negative  


 


Urine Ketones  1+ H  


 


Urine Blood  1+ H  


 


Urine Nitrite  Negative  


 


Urine Bilirubin  Negative  


 


Urine Urobilinogen  Negative  


 


Ur Leukocyte Esterase  Negative  


 


Urine WBC (Auto)  3  


 


Urine RBC (Auto)  9  


 


Urine RBC   


 


Urine WBC   


 


Ur Epithelial Cells   


 


Urine Bacteria   


 


Urine Mucus  Rare  


 


Ur Random Sodium   


 


Ur Random Potassium   


 


Ur Random Chloride   


 


Blood Type   


 


Antibody Screen   

















Sinus rhythm with RBBB





Problem List





- Problems


(1) Small bowel obstruction


Code(s): K56.609 - UNSP INTESTNL OBST, UNSP AS TO PARTIAL VERSUS COMPLETE OBST 

  





(2) Inguinal hernia


Code(s): K40.90 - UNIL INGUINAL HERNIA, W/O OBST OR GANGR, NOT SPCF AS RECUR   





(3) HTN (hypertension)


Code(s): I10 - ESSENTIAL (PRIMARY) HYPERTENSION   


Qualifiers: 


   Hypertension type: essential hypertension   Qualified Code(s): I10 - 

Essential (primary) hypertension   





(4) Hypercholesterolemia


Code(s): E78.00 - PURE HYPERCHOLESTEROLEMIA, UNSPECIFIED   





Assessment/Plan





1. Right inguinal hernia with high grade small bowel obstruction


2. Hypertension


3. Hypercholesterolemia


4. Right bundle branch block





PLAN:


1. No absolute contraindication in proceeding with surgery in view of absence 

of ischemic symptoms, decompensated congestive heart failure or malignant 

arrhythmia and in view of presentation with high grade SBO requiring urgent 

surgery. Post operative cardiac enzyme and ECG recommended


2. Continue present management


3. Further cardiac work up can be done as outpatient with his cardiologist 





Further plans are to follow


Favian Nassar MD

## 2018-05-09 LAB
ANION GAP SERPL CALC-SCNC: 4 MMOL/L (ref 8–16)
BASOPHILS # BLD: 0.1 % (ref 0–2)
BUN SERPL-MCNC: 18 MG/DL (ref 7–18)
CALCIUM SERPL-MCNC: 7.2 MG/DL (ref 8.5–10.1)
CHLORIDE SERPL-SCNC: 105 MMOL/L (ref 98–107)
CO2 SERPL-SCNC: 28 MMOL/L (ref 21–32)
CREAT SERPL-MCNC: 0.7 MG/DL (ref 0.7–1.3)
DEPRECATED RDW RBC AUTO: 16.4 % (ref 11.9–15.9)
EOSINOPHIL # BLD: 0 % (ref 0–4.5)
GLUCOSE SERPL-MCNC: 111 MG/DL (ref 74–106)
HCT VFR BLD CALC: 28.1 % (ref 35.4–49)
HGB BLD-MCNC: 9.2 GM/DL (ref 11.7–16.9)
LYMPHOCYTES # BLD: 5.6 % (ref 8–40)
MCH RBC QN AUTO: 26.2 PG (ref 25.7–33.7)
MCHC RBC AUTO-ENTMCNC: 32.7 G/DL (ref 32–35.9)
MCV RBC: 80.1 FL (ref 80–96)
MONOCYTES # BLD AUTO: 6.2 % (ref 3.8–10.2)
NEUTROPHILS # BLD: 88.1 % (ref 42.8–82.8)
PLATELET # BLD AUTO: 227 K/MM3 (ref 134–434)
PMV BLD: 7.9 FL (ref 7.5–11.1)
POTASSIUM SERPLBLD-SCNC: 4.4 MMOL/L (ref 3.5–5.1)
RBC # BLD AUTO: 3.5 M/MM3 (ref 4–5.6)
SODIUM SERPL-SCNC: 137 MMOL/L (ref 136–145)
WBC # BLD AUTO: 8 K/MM3 (ref 4–10)

## 2018-05-09 RX ADMIN — LABETALOL HYDROCHLORIDE SCH MG: 200 TABLET, FILM COATED ORAL at 21:13

## 2018-05-09 RX ADMIN — HEPARIN SODIUM SCH UNIT: 5000 INJECTION, SOLUTION INTRAVENOUS; SUBCUTANEOUS at 21:12

## 2018-05-09 RX ADMIN — HEPARIN SODIUM SCH UNIT: 5000 INJECTION, SOLUTION INTRAVENOUS; SUBCUTANEOUS at 06:18

## 2018-05-09 RX ADMIN — ATORVASTATIN CALCIUM SCH MG: 20 TABLET, FILM COATED ORAL at 21:12

## 2018-05-09 RX ADMIN — CITALOPRAM HYDROBROMIDE SCH MG: 20 TABLET ORAL at 09:00

## 2018-05-09 RX ADMIN — HEPARIN SODIUM SCH UNIT: 5000 INJECTION, SOLUTION INTRAVENOUS; SUBCUTANEOUS at 13:41

## 2018-05-09 NOTE — PN
Progress Note, Physician


Chief Complaint: 





day #1 s/p open RIH repair





- Current Medication List


Current Medications: 


Active Medications





Acetaminophen (Tylenol -)  650 mg PO Q4H PRN


   PRN Reason: FEVER


Atorvastatin Calcium (Lipitor -)  20 mg PO HS [facility]


   Last Admin: 05/08/18 22:24 Dose:  20 mg


Citalopram Hydrobromide (Celexa -)  10 mg PO DAILY [facility]


   Last Admin: 05/09/18 09:00 Dose:  10 mg


Heparin Sodium (Porcine) (Heparin -)  5,000 unit SQ TID [facility]


   Last Admin: 05/09/18 13:41 Dose:  5,000 unit


Dextrose/Sodium Chloride (D5-1/2ns -)  1,000 mls @ 75 mls/hr IV ASDIR [facility]


   Last Admin: 05/08/18 22:23 Dose:  75 mls/hr


Labetalol HCl (Normodyne -)  200 mg PO BID [facility]


Ondansetron HCl (Zofran Injection)  4 mg IVPUSH Q6H PRN


   PRN Reason: NAUSEA AND/OR VOMITING


Oxycodone HCl (Roxicodone -)  5 mg PO Q6H PRN


   PRN Reason: PAIN LEVEL 6-10











- Objective


Vital Signs: 


 Vital Signs











Temperature  99.3 F   05/09/18 05:29


 


Pulse Rate  86   05/09/18 05:29


 


Respiratory Rate  20   05/09/18 05:29


 


Blood Pressure  145/67   05/09/18 05:29


 


O2 Sat by Pulse Oximetry (%)  93 L  05/08/18 21:48











Labs: 


 CBC, BMP





 05/09/18 07:45 





 05/09/18 07:45 





 INR, PTT











INR  1.16  (0.82-1.09)  H  05/07/18  23:40    














Assessment/Plan





Doing well after GA.  Pain well controlled.  Continue current care

## 2018-05-09 NOTE — PN
Progress Note, Physician


History of Present Illness: 





Right incarcerated inguinal hernia repair with mesh POD#1. Trial of clear 

liquid diet. Mild incisional discomfort, but denies chest pain or dyspnea.





- Current Medication List


Current Medications: 


Active Medications





Acetaminophen (Tylenol -)  650 mg PO Q4H PRN


   PRN Reason: FEVER


Atorvastatin Calcium (Lipitor -)  20 mg PO HS [facility]


   Last Admin: 05/08/18 22:24 Dose:  20 mg


Citalopram Hydrobromide (Celexa -)  10 mg PO DAILY [facility]


   Last Admin: 05/09/18 09:00 Dose:  10 mg


Heparin Sodium (Porcine) (Heparin -)  5,000 unit SQ TID [facility]


   Last Admin: 05/09/18 06:18 Dose:  5,000 unit


Dextrose/Sodium Chloride (D5-1/2ns -)  1,000 mls @ 75 mls/hr IV ASDIR [facility]


   Last Admin: 05/08/18 22:23 Dose:  75 mls/hr


Ondansetron HCl (Zofran Injection)  4 mg IVPUSH Q6H PRN


   PRN Reason: NAUSEA AND/OR VOMITING


Oxycodone HCl (Roxicodone -)  5 mg PO Q6H PRN


   PRN Reason: PAIN LEVEL 6-10











- Objective


Vital Signs: 


 Vital Signs











Temperature  99.3 F   05/09/18 05:29


 


Pulse Rate  86   05/09/18 05:29


 


Respiratory Rate  20   05/09/18 05:29


 


Blood Pressure  145/67   05/09/18 05:29


 


O2 Sat by Pulse Oximetry (%)  93 L  05/08/18 21:48











Constitutional: Yes: No Distress, Calm


Neck: Yes: Supple


Cardiovascular: Yes: Regular Rate and Rhythm


Respiratory: Yes: Regular, CTA Bilaterally


Gastrointestinal: Yes: Normal Bowel Sounds, Soft


Edema: No


Labs: 


 CBC, BMP





 05/09/18 07:45 





 05/09/18 07:45 





 INR, PTT











INR  1.16  (0.82-1.09)  H  05/07/18  23:40    














Problem List





- Problems


(1) HTN (hypertension)


Code(s): I10 - ESSENTIAL (PRIMARY) HYPERTENSION   


Qualifiers: 


   Hypertension type: essential hypertension   Qualified Code(s): I10 - 

Essential (primary) hypertension   





(2) Hypercholesterolemia


Code(s): E78.00 - PURE HYPERCHOLESTEROLEMIA, UNSPECIFIED   





(3) Inguinal hernia


Code(s): K40.90 - UNIL INGUINAL HERNIA, W/O OBST OR GANGR, NOT SPCF AS RECUR   


Qualifiers: 


   Laterality: unilateral 





(4) Right bundle branch block


Code(s): I45.10 - UNSPECIFIED RIGHT BUNDLE-BRANCH BLOCK   





Assessment/Plan


Echo: 05/08/2018 Normal biventricular size and fxn, mild-mod AS, mild MR, TR, 

pulm HTN





1. Right incarcerated inguinal hernia repair with mesh POD#1 stable CV-wise


2. Hypertension


3. Hypercholesterolemia


4. Right bundle branch block








PLAN:


1. Continue Lipitor 20 qhs, resume ASA 81 qd once hemostasis achieved, resume 

labetolol 200 bid with uptitration to target as tolerated


2. DVT prophylaxis, analgesia as needed, advance diet as tolerated


3. Further cardiac work up can be done as outpatient with his cardiologist, bhargavi 

planning

## 2018-05-09 NOTE — PN
<Ej Pozo - Last Filed: 05/09/18 18:05>


Physical Exam: 


SUBJECTIVE: Pt reports tolerating his food well even though he wants to eat a 

full diet. Pt denies any flatus or BM at this point. He reports feeling much 

better with improved abdominal pain.








OBJECTIVE:





 Vital Signs











 Period  Temp  Pulse  Resp  BP Sys/Matos  Pulse Ox


 


 Last 24 Hr  98.0 F-99.6 F  76-90  18-29  112-145/47-68  93-97











GENERAL: NAD, awake, alert, and sitting in bed


HEENT: EOMI, TYRA, dry-moist mucosa


LUNGS: CTA bilaterally, no wheezes, no crackles, no accessory muscle use. 


HEART: RRR, S1, S2 without murmur


ABDOMEN: Soft, nontender, nondistended, hypoactive bowel sounds, no guarding, 

incision R inguinal region C/D/I currently, extensive L inguinal hernia 

unchanged from previous exam


EXTREMITIES: 2+ DP pulses, well-perfused, no edema. 


PSYCH: Normal mood, normal affect.


SKIN: Warm, dry, no rashes, incision as above














 Laboratory Results - last 24 hr











  05/08/18 05/08/18 05/09/18





  22:00 22:00 07:45


 


WBC   


 


RBC   


 


Hgb   


 


Hct   


 


MCV   


 


MCH   


 


MCHC   


 


RDW   


 


Plt Count   


 


MPV   


 


Neutrophils %   


 


Lymphocytes %   


 


Monocytes %   


 


Eosinophils %   


 


Basophils %   


 


Sodium  137  138  137


 


Potassium  4.3   4.4


 


Chloride  104   105


 


Carbon Dioxide  28   28


 


Anion Gap  5 L   4 L


 


BUN  24 H D   18  D


 


Creatinine  0.9   0.7  D


 


Random Glucose  96   111 H


 


Calcium  7.1 L   7.2 L


 


Ferritin    186.247














  05/09/18





  07:45


 


WBC  8.0


 


RBC  3.50 L


 


Hgb  9.2 L


 


Hct  28.1 L


 


MCV  80.1


 


MCH  26.2


 


MCHC  32.7


 


RDW  16.4 H


 


Plt Count  227


 


MPV  7.9


 


Neutrophils %  88.1 H


 


Lymphocytes %  5.6 L


 


Monocytes %  6.2


 


Eosinophils %  0.0


 


Basophils %  0.1


 


Sodium 


 


Potassium 


 


Chloride 


 


Carbon Dioxide 


 


Anion Gap 


 


BUN 


 


Creatinine 


 


Random Glucose 


 


Calcium 


 


Ferritin 








Active Medications











Generic Name Dose Route Start Last Admin





  Trade Name Freq  PRN Reason Stop Dose Admin


 


Acetaminophen  650 mg  05/08/18 21:07  





  Tylenol -  PO   





  Q4H PRN   





  FEVER   


 


Atorvastatin Calcium  20 mg  05/08/18 22:00  05/08/18 22:24





  Lipitor -  PO   20 mg





  HS ABRAN   Administration


 


Citalopram Hydrobromide  10 mg  05/09/18 10:00  05/09/18 09:00





  Celexa -  PO   10 mg





  DAILY ABRAN   Administration


 


Heparin Sodium (Porcine)  5,000 unit  05/08/18 22:00  05/09/18 13:41





  Heparin -  SQ   5,000 unit





  TID ABRAN   Administration


 


Dextrose/Sodium Chloride  1,000 mls @ 75 mls/hr  05/08/18 19:15  05/08/18 22:23





  D5-1/2ns -  IV   75 mls/hr





  ASDIR ABRAN   Administration


 


Labetalol HCl  200 mg  05/09/18 22:00  





  Normodyne -  PO   





  BID ABRAN   


 


Ondansetron HCl  4 mg  05/08/18 19:04  





  Zofran Injection  IVPUSH   





  Q6H PRN   





  NAUSEA AND/OR VOMITING   


 


Oxycodone HCl  5 mg  05/09/18 10:24  





  Roxicodone -  PO   





  Q6H PRN   





  PAIN LEVEL 6-10   











ASSESSMENT/PLAN:


1) SBO 2/2 R inguinal hernia


   --POD#1 open hernia repair


   --Pt tolerating clear liquid diet effectively 


   --Continue pain control with Roxicodone 5mg PO q6h PRN


      --will discontinue 10mg and Morphine due to lack of use from patient


   --Continue to monitor for flatus and BM


   --Dr. Olmstead on the case





2) Hyponatremia


   --Resolved





3) Liver mass


   --Found on CT scan with suspect metastasis


   --CXR with suspect RUL nodule


   --Discussed with pt and family previously, however would not like to discuss 

it further. Event staging previous declined





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


   --Continue celexa 10mg qDaily


   --Discontinue Zofran





5) H/o HTN


   --Labetalol HCL 200mg PO BID





FEN:


   --Tolerating PO currently


   --None currently


   --Advance as tolerated --> softs lunch and regular for dinner if tolerated





PPX:


   DVT - Heparin SQ


   GI - none indicated currently





Dispo: Continue monitoring; awaiting flatus/BM and anticipate DC soon


Case discussed with Dr. Margarette Pozo, DO - IM PGY-1





Visit type





- Emergency Visit


Emergency Visit: No





- New Patient


This patient is new to me today: No





- Critical Care


Critical Care patient: No





<Maryann Pfeiffer - Last Filed: 05/09/18 20:16>


Physical Exam: 


 Vital Signs











Temperature  99.6 F   05/09/18 14:00


 


Pulse Rate  86   05/09/18 05:29


 


Respiratory Rate  20   05/09/18 05:29


 


Blood Pressure  145/67   05/09/18 05:29


 


O2 Sat by Pulse Oximetry (%)  93 L  05/08/18 21:48








 CBCD











WBC  8.0 K/mm3 (4.0-10.0)   05/09/18  07:45    


 


RBC  3.50 M/mm3 (4.00-5.60)  L  05/09/18  07:45    


 


Hgb  9.2 GM/dL (11.7-16.9)  L  05/09/18  07:45    


 


Hct  28.1 % (35.4-49)  L  05/09/18  07:45    


 


MCV  80.1 fl (80-96)   05/09/18  07:45    


 


MCHC  32.7 g/dl (32.0-35.9)   05/09/18  07:45    


 


RDW  16.4 % (11.9-15.9)  H  05/09/18  07:45    


 


Plt Count  227 K/MM3 (134-434)   05/09/18  07:45    


 


MPV  7.9 fl (7.5-11.1)   05/09/18  07:45    








 CMP











Sodium  137 mmol/L (136-145)   05/09/18  07:45    


 


Potassium  4.4 mmol/L (3.5-5.1)   05/09/18  07:45    


 


Chloride  105 mmol/L ()   05/09/18  07:45    


 


Carbon Dioxide  28 mmol/L (21-32)   05/09/18  07:45    


 


Anion Gap  4  (8-16)  L  05/09/18  07:45    


 


BUN  18 mg/dL (7-18)  D 05/09/18  07:45    


 


Creatinine  0.7 mg/dL (0.7-1.3)  D 05/09/18  07:45    


 


Creat Clearance w eGFR  > 60  (>60)   05/08/18  07:45    


 


Random Glucose  111 mg/dL ()  H  05/09/18  07:45    


 


Calcium  7.2 mg/dL (8.5-10.1)  L  05/09/18  07:45    


 


Total Bilirubin  0.7 mg/dL (0.2-1.0)   05/08/18  07:45    


 


AST  47 U/L (15-37)  H  05/08/18  07:45    


 


ALT  42 U/L (12-78)   05/08/18  07:45    


 


Alkaline Phosphatase  130 U/L ()  H  05/08/18  07:45    


 


Total Protein  5.7 g/dl (6.4-8.2)  L  05/08/18  07:45    


 


Albumin  2.3 g/dl (3.4-5.0)  L  05/08/18  07:45    








 CARDIAC ENZYMES











Troponin I  < 0.03 ng/ml (0.00-0.06)   05/07/18  14:30    








 Current Medications











Generic Name Dose Route Start Last Admin





  Trade Name Freq  PRN Reason Stop Dose Admin


 


Acetaminophen  650 mg  05/08/18 21:07  





  Tylenol -  PO   





  Q4H PRN   





  FEVER   


 


Atorvastatin Calcium  20 mg  05/08/18 22:00  05/08/18 22:24





  Lipitor -  PO   20 mg





  HS ABRAN   Administration


 


Citalopram Hydrobromide  10 mg  05/09/18 10:00  05/09/18 09:00





  Celexa -  PO   10 mg





  DAILY ABRAN   Administration


 


Heparin Sodium (Porcine)  5,000 unit  05/08/18 22:00  05/09/18 13:41





  Heparin -  SQ   5,000 unit





  TID ABRAN   Administration


 


Dextrose/Sodium Chloride  1,000 mls @ 75 mls/hr  05/08/18 19:15  05/08/18 22:23





  D5-1/2ns -  IV   75 mls/hr





  ASDIR ABRAN   Administration


 


Labetalol HCl  200 mg  05/09/18 22:00  





  Normodyne -  PO   





  BID ABRAN   


 


Oxycodone HCl  5 mg  05/09/18 10:24  





  Roxicodone -  PO   





  Q6H PRN   





  PAIN LEVEL 6-10   








 Home Medications











 Medication  Instructions  Recorded


 


Amlodipine Besylate [Norvasc -] 10 mg PO DAILY 05/07/18


 


Aspirin [Aspirin EC] 81 mg PO DAILY 05/07/18


 


Citalopram Hydrobromide [Celexa -] 20 mg PO DAILY 05/07/18


 


Labetalol HCl [Normodyne -] 300 mg PO BID 05/07/18


 


Oxycodone HCl/Acetaminophen 1 tab PO Q6H 05/07/18





[Percocet 5-325 mg Tablet]  


 


Simvastatin [Zocor -] 40 mg PO DAILY 05/07/18

## 2018-05-10 VITALS — HEART RATE: 83 BPM | SYSTOLIC BLOOD PRESSURE: 130 MMHG | DIASTOLIC BLOOD PRESSURE: 63 MMHG | TEMPERATURE: 99.3 F

## 2018-05-10 LAB
SERUM IRON SATURATION: 6 % (ref 15–55)
TIBC SERPL-MCNC: 198 UG/DL (ref 250–450)
UIBC SERPL-MCNC: 186 UG/DL (ref 111–343)

## 2018-05-10 RX ADMIN — LABETALOL HYDROCHLORIDE SCH MG: 200 TABLET, FILM COATED ORAL at 09:38

## 2018-05-10 RX ADMIN — HEPARIN SODIUM SCH UNIT: 5000 INJECTION, SOLUTION INTRAVENOUS; SUBCUTANEOUS at 13:26

## 2018-05-10 RX ADMIN — CITALOPRAM HYDROBROMIDE SCH MG: 20 TABLET ORAL at 09:38

## 2018-05-10 RX ADMIN — HEPARIN SODIUM SCH UNIT: 5000 INJECTION, SOLUTION INTRAVENOUS; SUBCUTANEOUS at 06:30

## 2018-05-10 NOTE — DS
Physical Exam: 


SUBJECTIVE: Pt has no new complaints today. He reports passing gas and with one 

BM this morning. He also states he's been walking with physical therapy. Denies 

any nausea, vomiting.





OBJECTIVE:





 Vital Signs











 Period  Temp  Pulse  Resp  BP Sys/Matos  Pulse Ox


 


 Last 24 Hr  98.7 F-99.4 F  80-93  20-22  109-130/55-66  93








PHYSICAL EXAM





GENERAL: NAD, awake, alert, and fully oriented


HEENT: EOMI, TYRA, NC/AT, moist mucosa


LUNGS: CTA bilaterally, no wheezes, no crackles, no accessory muscle use. 


HEART: RRR, S1, S2 without murmur, rub or gallop.


ABDOMEN: Soft, nontender, nondistended, normoactive bowel sounds, no guarding, 

RLQ incision closed with stables C/D/I, no hepatomegaly, L large inguinal 

hernia noted


EXTREMITIES: 2+ DP pulses, warm, no edema. 


PSYCH: Normal mood, normal affect.


SKIN: Warm, dry, no rashes, incision as above





LABS


 Laboratory Results - last 24 hr











  18





  07:45


 


Iron  12 L


 


TIBC  198 L


 


Iron Saturation  6 L











HOSPITAL COURSE:





Date of Admission:18





Date of Discharge: 05/10/18








Pt was admitted on 18 for acute lower quadrant abdominal pain found to have 

a R incarcerated inguinal hernia. Pt's hernia was reduced manually and brought 

to the OR for open hernia reduction with mesh placement. Post-operatively, pt 

began to tolerate clear liquids without difficulty and advanced to soft diet 

which he continued to tolerate. Fortunately, pt began to have flatus and began 

passing normal character and quality bowel movements on POD 2. Finally, he 

walked about 100ft with walker assistance with physical therapy on his POD2. Pt 

and family would like for visiting nurse service for his chronic back pain and 

remaining rehabilitation. Due to his chronic back pain and using Percocet 5-

325mg as needed, he is being discharged with a prescription for Colace 100mg PO 

TID combat any constipation. Pt is being discharged in stable condition and has 

follow-up with Dr. Olmstead in 7-10 days.





In addition, pt was found to have a UA with 3 WBC, no nitrites, no leukocyte 

esterase, and rare bacteria which cultured to be pansensitive pseudomonas. Pt 

denies any urinary complaints throughout admission. He has instructions to be 

discharged with Ceftin 250mg PO BID for the next 7 days.





OF NOTE: Pt's liver was noted to have heterogenous masses which upon discussion 

he and family members did not want to undergo event staging. We recommended 

that these masses should be further investigated on an outpatient basis, but we 

respected the pt's decision to autonomy.











Additional Imagin/7/18 Abdominal and Pelvis CT:


         High-grade small bowel obstruction presumably complete with abrupt 

transition point within a large RIGHT inguinal hernia.


         Kolby large LEFT inguinal hernia containing nearly the entire descending 

and sigmoid colon with no evidence of obstruction


         Multiple indeterminant hepatic mass lesions, concerning for metastasis.


         Enlarged portacaval and peripancreatic lymph nodes also concerning for 

malignancy 


         Small layering R pleural effusion


         Cardiomegaly, at least moderate coronary artery calcific 

atherosclerosis and aortic valve calcification


         Englarged prostate gland





   18 Abdominal XR:


         Distended loops of small bowel predominantly in the R abdomen 

concerning for SBO


   


   18 Echocardiogram:


         LV size, thickness and function are normal


         LA mildly dilated


         Mild MR, TR and valvular aortic stenosis


         Severe pulmonary hypertension


Minutes to complete discharge: 40





Discharge Summary


Reason For Visit: LIVER MASS


Current Active Problems





Bowel obstruction (Acute)


HTN (hypertension) (Acute)


Hypercholesterolemia (Acute)


Inguinal hernia (Acute)


Liver mass (Acute)


Right bundle branch block (Acute)


Small bowel obstruction (Acute)








Condition: Stable





- Instructions


Diet, Activity, Other Instructions: 


You were seen here for your abdominal pain that was attributed to a bowel 

obstruction from your hernia. You underwent surgery to help fix your hernia.


Please do not lift weight greater than 5-10 lbs for the next week and then 

weight greater than 20-30lbs for the next four weeks as your surgical incision 

heals.





MEDICATIONS:


   Please continue your medications as you have been


   You were also found to have a urinary tract infection; please take Ceftin 

250mg TWICE per day by Mouth for the next 7 days.


      --This prescription has been sent to your pharmacy


   You were also given a stool softener to take 3 times per day called Colace 

100mg


   Please only take Celexa 10mg ONCE per day by mouth for your anxiety; we 

decreased your dose due to an EKG abnormality seen


   


FOLLOW-UPs:


   Please follow-up with Dr. Olmstead his office within the next 7-10 days to 

evaluate your surgical wound


   Please follow-up with Dr. Azul ([phone]) regarding what happened 

here today


      --We recommend you also follow-up with your primary care provider for you 

liver abnormality found on your admission


      --You will need an EKG as well to see if your long Qtc interval has 

decreased in length





Referrals: 


Nikko Olmstead MD [Staff Physician] - 


Patel Azul MD [Primary Care Provider] - 


Disposition: VNS/HOME HEALTH CARE





- Home Medications


Comprehensive Discharge Medication List: 


Ambulatory Orders





Amlodipine Besylate [Norvasc -] 10 mg PO DAILY 18 


Aspirin [Aspirin EC] 81 mg PO DAILY 18 


Labetalol HCl [Normodyne -] 300 mg PO BID 18 


Oxycodone HCl/Acetaminophen [Percocet 5-325 mg Tablet] 1 tab PO Q6H 18 


Simvastatin [Zocor -] 40 mg PO DAILY 18 


Citalopram Hydrobromide [Celexa -] 10 mg PO DAILY #30 tablet 05/10/18 


Docusate Sodium [Colace -] 100 mg PO TID #90 capsule 05/10/18 








This patient is new to me today: No


Emergency Visit: No


Critical Care patient: No





- Discharge Referral


Referred to [facility] No

## 2018-05-10 NOTE — PN
Teaching Attending Note


Name of Resident: Ej Pozo





ATTENDING PHYSICIAN STATEMENT





I saw and evaluated the patient.


I reviewed the resident's note and discussed the case with the resident.


I agree with the resident's findings and plan as documented.








SUBJECTIVE:








OBJECTIVE:


 Vital Signs











Temperature  98.7 F   05/10/18 06:00


 


Pulse Rate  82   05/10/18 06:00


 


Respiratory Rate  20   05/10/18 06:00


 


Blood Pressure  114/59   05/10/18 06:00


 


O2 Sat by Pulse Oximetry (%)  93 L  05/09/18 21:00








NAD . AAOx3 


Cv; RRR, 3/6 Sm at LUSB and to a minimal extent RUSB with extension to carotid. 

3/6 SM at apex with radiatio to axilla 


Lungs: R base crackles 


Ext: no edema , no erythema 


Abd: soft, slightly distended. mild discomfort in R inguinal area with no 

hernia felt ( reduced earlier by Dr. venegas ) . L inguinal hernia filing the L 

hemiscrotum with bowel sounds heard in henia sac


no skin changes over henia 


 CBCD











WBC  8.0 K/mm3 (4.0-10.0)   05/09/18  07:45    


 


RBC  3.50 M/mm3 (4.00-5.60)  L  05/09/18  07:45    


 


Hgb  9.2 GM/dL (11.7-16.9)  L  05/09/18  07:45    


 


Hct  28.1 % (35.4-49)  L  05/09/18  07:45    


 


MCV  80.1 fl (80-96)   05/09/18  07:45    


 


MCHC  32.7 g/dl (32.0-35.9)   05/09/18  07:45    


 


RDW  16.4 % (11.9-15.9)  H  05/09/18  07:45    


 


Plt Count  227 K/MM3 (134-434)   05/09/18  07:45    


 


MPV  7.9 fl (7.5-11.1)   05/09/18  07:45    








 CMP











Sodium  137 mmol/L (136-145)   05/09/18  07:45    


 


Potassium  4.4 mmol/L (3.5-5.1)   05/09/18  07:45    


 


Chloride  105 mmol/L ()   05/09/18  07:45    


 


Carbon Dioxide  28 mmol/L (21-32)   05/09/18  07:45    


 


Anion Gap  4  (8-16)  L  05/09/18  07:45    


 


BUN  18 mg/dL (7-18)  D 05/09/18  07:45    


 


Creatinine  0.7 mg/dL (0.7-1.3)  D 05/09/18  07:45    


 


Creat Clearance w eGFR  > 60  (>60)   05/08/18  07:45    


 


Random Glucose  111 mg/dL ()  H  05/09/18  07:45    


 


Calcium  7.2 mg/dL (8.5-10.1)  L  05/09/18  07:45    


 


Total Bilirubin  0.7 mg/dL (0.2-1.0)   05/08/18  07:45    


 


AST  47 U/L (15-37)  H  05/08/18  07:45    


 


ALT  42 U/L (12-78)   05/08/18  07:45    


 


Alkaline Phosphatase  130 U/L ()  H  05/08/18  07:45    


 


Total Protein  5.7 g/dl (6.4-8.2)  L  05/08/18  07:45    


 


Albumin  2.3 g/dl (3.4-5.0)  L  05/08/18  07:45    








 CARDIAC ENZYMES











Troponin I  < 0.03 ng/ml (0.00-0.06)   05/07/18  14:30    








 Current Medications











Generic Name Dose Route Start Last Admin





  Trade Name Freq  PRN Reason Stop Dose Admin


 


Acetaminophen  650 mg  05/08/18 21:07  





  Tylenol -  PO   





  Q4H PRN   





  FEVER   


 


Atorvastatin Calcium  20 mg  05/08/18 22:00  05/09/18 21:12





  Lipitor -  PO   20 mg





  HS ABRAN   Administration


 


Citalopram Hydrobromide  10 mg  05/09/18 10:00  05/10/18 09:38





  Celexa -  PO   10 mg





  DAILY ABRAN   Administration


 


Docusate Sodium  100 mg  05/10/18 14:00  





  Colace -  PO   





  TID ABRAN   


 


Heparin Sodium (Porcine)  5,000 unit  05/08/18 22:00  05/10/18 06:30





  Heparin -  SQ   5,000 unit





  TID ABRAN   Administration


 


Dextrose/Sodium Chloride  1,000 mls @ 75 mls/hr  05/08/18 19:15  05/08/18 22:23





  D5-1/2ns -  IV   75 mls/hr





  ASDIR ABRAN   Administration


 


Labetalol HCl  200 mg  05/09/18 22:00  05/10/18 09:38





  Normodyne -  PO   200 mg





  BID ABRAN   Administration


 


Oxycodone HCl  5 mg  05/09/18 10:24  





  Roxicodone -  PO   





  Q6H PRN   





  PAIN LEVEL 6-10   








 Home Medications











 Medication  Instructions  Recorded


 


Amlodipine Besylate [Norvasc -] 10 mg PO DAILY 05/07/18


 


Aspirin [Aspirin EC] 81 mg PO DAILY 05/07/18


 


Citalopram Hydrobromide [Celexa -] 20 mg PO DAILY 05/07/18


 


Labetalol HCl [Normodyne -] 300 mg PO BID 05/07/18


 


Oxycodone HCl/Acetaminophen 1 tab PO Q6H 05/07/18





[Percocet 5-325 mg Tablet]  


 


Simvastatin [Zocor -] 40 mg PO DAILY 05/07/18














ASSESSMENT AND PLAN:


84 y/o man with h/o chronic back pain, HTN, L inguinal hernia, HLP and 

depression who presented with  abd pain and distentin and was found to have SBO 

a tthe level of R inguinal hernia 





#POD #2 s/p open [facility] repair surgeon Dr. Venegas,  pain control 


- IVF ( switch to D5w ) 





# Echo with Mod-severe AS, and mild MR, TR. due to the urgency of the surgery 

no surgical intervention should be delayed  for cardiac procedures. he is at 

high risk of jabari-op cardiac complications given his AS


- . Avoid any QTC prolonging agents. d/w Dr. venegas 





# Acute Hyponatremia improved





# Liver masses on CT scan : explained to pt and family possibility of Mets. 

also lung lesion seen . they opted  for no further w/u as inpt . even staging 

was declined. f/u as out PT 





# Prolonged QTC.  503.  decrease celexa to 10 from 20 daily . can't stop 

abruptly;  avoid any Qtc prolonging agents 


- No zofran 





# HTN: hold norvasc and labetalol. use IV meds if needed or po with sips after 

surgery 





DVT Px:

## 2018-05-10 NOTE — PN
Progress Note (short form)





- Note


Progress Note: 





Attending Surgeon POD #2





No c/o; tolerating diet; moving bowels and passing flatue





VSS AF





abdo-soft; incision c/d/i/; left hernia persists





IMP: doing well





PLAN: LWC rendered; OOB; office f/u 7-10 days post op.





Nikko Olmstead MD FACS

## 2018-05-10 NOTE — EKG
Test Reason : 

Blood Pressure : ***/*** mmHG

Vent. Rate : 080 BPM     Atrial Rate : 080 BPM

   P-R Int : 140 ms          QRS Dur : 142 ms

    QT Int : 438 ms       P-R-T Axes : 024 -17 -07 degrees

   QTc Int : 505 ms

 

SINUS RHYTHM WITH OCCASIONAL PREMATURE VENTRICULAR COMPLEXES

RIGHT BUNDLE BRANCH BLOCK

MODERATE VOLTAGE CRITERIA FOR LVH, MAY BE NORMAL VARIANT

ABNORMAL ECG

WHEN COMPARED WITH ECG OF 07-MAY-2018 16:06,

PREMATURE VENTRICULAR COMPLEXES ARE NOW PRESENT

INVERTED T WAVES HAVE REPLACED NONSPECIFIC T WAVE ABNORMALITY IN 

INFERIOR LEADS

T WAVE INVERSION NO LONGER EVIDENT IN ANTERIOR LEADS

Confirmed by KIKO PUENTES MD ([id number]) on 5/10/2018 11:41:22 AM

 

Referred By: KEVIN ROMERO           Confirmed By:KIKO PUENTES MD

## 2018-05-10 NOTE — PATH
Surgical Pathology Report



Patient Name:  CRISTINA NUNEZ

Accession #:  [id number]

Med. Rec. #:  [id number]                                                        

   /Age/Gender:  1935 (Age: 83) / M

Account:  [id number]                                                          

             Location: [address] [place] [address]/[facility]

Taken:  2018

Received:  2018

Reported:  5/10/2018

Physicians:  MD Amol Bain M.D.

  



Specimen(s) Received

 TISSUE HERNIA 





Clinical History

Incarcerated hernia







Final Diagnosis

INGUINAL HERNIA SAC TISSUE, HERNIA REPAIR:MESOTHELIAL LINED FIBROMEMBRANOUS TO

FIBROADIPOSE TISSUE CONSISTENT WITH HERNIA SAC AND CONTENTS.





***Electronically Signed***

Flor Bundy M.D.





Gross Description

Received in formalin labeled "hernia sac tissue," is a 10.0 x 8.0 x 1.0 cm

aggregate of multiple portions of tan-grey fibromembranous tissue with attached

fat, consistent with a hernia sac. Representative sections are submitted in one

cassette.

/2018



saudi

## 2018-05-10 NOTE — PN
Progress Note, Physician


History of Present Illness: 





Right incarcerated inguinal hernia repair with mesh POD#2. Tolerating soft 

diet. Mild incisional discomfort improved, and denies chest pain or dyspnea.





- Current Medication List


Current Medications: 


Active Medications





Acetaminophen (Tylenol -)  650 mg PO Q4H PRN


   PRN Reason: FEVER


Atorvastatin Calcium (Lipitor -)  20 mg PO HS [facility]


   Last Admin: 05/09/18 21:12 Dose:  20 mg


Citalopram Hydrobromide (Celexa -)  10 mg PO DAILY [facility]


   Last Admin: 05/10/18 09:38 Dose:  10 mg


Docusate Sodium (Colace -)  100 mg PO TID [facility]


Heparin Sodium (Porcine) (Heparin -)  5,000 unit SQ TID [facility]


   Last Admin: 05/10/18 06:30 Dose:  5,000 unit


Dextrose/Sodium Chloride (D5-1/2ns -)  1,000 mls @ 75 mls/hr IV ASDIR [facility]


   Last Admin: 05/08/18 22:23 Dose:  75 mls/hr


Labetalol HCl (Normodyne -)  200 mg PO BID [facility]


   Last Admin: 05/10/18 09:38 Dose:  200 mg


Oxycodone HCl (Roxicodone -)  5 mg PO Q6H PRN


   PRN Reason: PAIN LEVEL 6-10


   Last Admin: 05/10/18 10:40 Dose:  5 mg











- Objective


Vital Signs: 


 Vital Signs











Temperature  98.7 F   05/10/18 06:00


 


Pulse Rate  82   05/10/18 06:00


 


Respiratory Rate  20   05/10/18 06:00


 


Blood Pressure  114/59   05/10/18 06:00


 


O2 Sat by Pulse Oximetry (%)  93 L  05/09/18 21:00











Constitutional: Yes: No Distress, Calm


Neck: Yes: Supple


Cardiovascular: Yes: Regular Rate and Rhythm


Respiratory: Yes: Regular, CTA Bilaterally


Gastrointestinal: Yes: Normal Bowel Sounds, Soft


Edema: No


Labs: 


 CBC, BMP





 05/09/18 07:45 





 05/09/18 07:45 





 INR, PTT











INR  1.16  (0.82-1.09)  H  05/07/18  23:40    














Problem List





- Problems


(1) HTN (hypertension)


Code(s): I10 - ESSENTIAL (PRIMARY) HYPERTENSION   


Qualifiers: 


   Hypertension type: essential hypertension   Qualified Code(s): I10 - 

Essential (primary) hypertension   





(2) Hypercholesterolemia


Code(s): E78.00 - PURE HYPERCHOLESTEROLEMIA, UNSPECIFIED   





(3) Inguinal hernia


Code(s): K40.90 - UNIL INGUINAL HERNIA, W/O OBST OR GANGR, NOT SPCF AS RECUR   


Qualifiers: 


   Laterality: unilateral 





(4) Right bundle branch block


Code(s): I45.10 - UNSPECIFIED RIGHT BUNDLE-BRANCH BLOCK   





Assessment/Plan


Echo: 05/08/2018 Normal biventricular size and fxn, mild-mod AS, mild MR, TR, 

pulm HTN





1. Right incarcerated inguinal hernia repair with mesh POD#2 stable CV-wise


2. Hypertension


3. Hypercholesterolemia


4. Right bundle branch block








PLAN:


1. Continue Lipitor 20 qhs, resume ASA 81 qd once hemostasis achieved, continue 

labetolol 200 bid with uptitration to target as tolerated


2. DVT prophylaxis, analgesia as needed, encourage ambulation


3. Further cardiac work up can be done as outpatient with his cardiologist, dc 

planning


4. Recheck QTc

## 2018-05-11 NOTE — OP
DATE OF OPERATION:  05/08/2018

 

PREOPERATIVE DIAGNOSIS:  Incarcerated right inguinal hernia.

 

POSTOPERATIVE DIAGNOSIS:  Incarcerated right inguinal hernia.

 

PROCEDURE:  Repair of incarcerated right inguinal hernia with Pro  mesh.

 

SURGEON:  Nikko Olmstead MD

 

ASSISTANT:  Mary Calvillo PA-C

ANESTHESIA:  General.

 

OPERATIVE FINDINGS:  There was an incarcerated right inguinal hernia containing

viable small bowel.  The sac was edematous.  The floor was markedly attenuated.
  The

rest of the findings were unremarkable. 

 

PROCEDURE:  The patient was placed on the operating room table in the supine 
position

and after the induction of general anesthesia and placement of a Hassan catheter 
the

abdomen was prepped with ChloraPrep and draped in sterile fashion.  A timeout 
was

taken and a right groin incision was made with a scalpel and taken down through 
skin

and subcutaneous tissue and Arturo fascia.  The previously noted findings were

observed.  The hernia sac was bluntly dissected up to the internal ring and the 
cord

structures identified and a Penrose drain placed around them for retraction and

identification purposes.  The sac was opened and the previously noted findings 
were

observed.  Redundant sac was excised using electrocautery and sent for 
pathological

examination and then high ligation was carried out with a 0 Vicryl pursestring

suture.  The floor of the inguinal canal and the direct component of the hernia 
were

then repaired by anchoring a piece of Pro  mesh to the pubic tubercle, 
shelving

edge and conjoint tendon, respectively, with interrupted 2-0 Prolene.

 

A keyhole was created for the cord structures and the tails of the mesh brought 
above

the level of the internal ring and anchored there with interrupted 2-0 Prolene. 

Hemostasis was checked for and noted to be good and then the wound was copiously

irrigated with sterile saline.  Hemostasis was again verified and then what 
could be

best identified as the external oblique fascia because it was markedly 
attenuated was

closed using continuous 2-0 Vicryl recreating the external ring.  Arturo fascia 
was

reapproximated with interrupted 2-0 Vicryl, the deep dermis with interrupted 3-0

Vicryl and the skin edges with surgical staples.  Dry sterile dressings were 
placed,

the procedure terminated at this point and the patient aroused from general

anesthesia and transferred to the postanesthesia care unit in stable condition, 
awake

and alert.

 

ESTIMATED BLOOD LOSS:  20 mL.

 

REPLACEMENT:  Crystalloid.

 

DRAINS:  None.

 

SPECIMEN:  Hernia sac to Pathology.

 

I, Nikko Olmstead, was physically present in the operating room from the time 
the

patient was placed on the operating room table until he was transferred to the

postanesthesia care unit in my accompaniment.

 

 

MD OMEGA Astudillo/[id number]

DD: 05/11/2018 08:17

DT: 05/11/2018 08:34

Job #:  [id number]

MTDD

## 2018-05-17 ENCOUNTER — APPOINTMENT (OUTPATIENT)
Dept: CARDIOLOGY | Facility: CLINIC | Age: 83
End: 2018-05-17

## 2018-07-09 ENCOUNTER — APPOINTMENT (OUTPATIENT)
Dept: HEMATOLOGY ONCOLOGY | Facility: CLINIC | Age: 83
End: 2018-07-09

## 2018-07-18 ENCOUNTER — APPOINTMENT (OUTPATIENT)
Dept: HEMATOLOGY ONCOLOGY | Facility: CLINIC | Age: 83
End: 2018-07-18
Payer: MEDICARE

## 2018-07-18 VITALS
BODY MASS INDEX: 39.87 KG/M2 | TEMPERATURE: 98 F | WEIGHT: 254 LBS | SYSTOLIC BLOOD PRESSURE: 77 MMHG | OXYGEN SATURATION: 97 % | DIASTOLIC BLOOD PRESSURE: 54 MMHG | RESPIRATION RATE: 20 BRPM | HEART RATE: 75 BPM | HEIGHT: 66.97 IN

## 2018-07-18 DIAGNOSIS — I82.409 ACUTE EMBOLISM AND THROMBOSIS OF UNSPECIFIED DEEP VEINS OF UNSPECIFIED LOWER EXTREMITY: ICD-10-CM

## 2018-07-18 DIAGNOSIS — C18.9 MALIGNANT NEOPLASM OF COLON, UNSPECIFIED: ICD-10-CM

## 2018-07-18 PROCEDURE — 99215 OFFICE O/P EST HI 40 MIN: CPT

## 2018-07-18 RX ORDER — PROCHLORPERAZINE MALEATE 10 MG/1
10 TABLET ORAL
Qty: 30 | Refills: 2 | Status: ACTIVE | COMMUNITY
Start: 2018-07-18 | End: 1900-01-01

## 2018-07-18 RX ORDER — DEXAMETHASONE 4 MG/1
4 TABLET ORAL
Qty: 4 | Refills: 11 | Status: ACTIVE | COMMUNITY
Start: 2018-07-18 | End: 1900-01-01

## 2018-08-02 ENCOUNTER — APPOINTMENT (OUTPATIENT)
Dept: HEMATOLOGY ONCOLOGY | Facility: CLINIC | Age: 83
End: 2018-08-02
Payer: MEDICARE

## 2018-08-08 ENCOUNTER — APPOINTMENT (OUTPATIENT)
Dept: HEMATOLOGY ONCOLOGY | Facility: CLINIC | Age: 83
End: 2018-08-08
Payer: MEDICARE

## 2018-08-08 VITALS
HEIGHT: 66.97 IN | RESPIRATION RATE: 16 BRPM | DIASTOLIC BLOOD PRESSURE: 55 MMHG | SYSTOLIC BLOOD PRESSURE: 87 MMHG | TEMPERATURE: 97.5 F | WEIGHT: 203 LBS | HEART RATE: 110 BPM | OXYGEN SATURATION: 96 % | BODY MASS INDEX: 31.86 KG/M2

## 2018-08-08 DIAGNOSIS — B37.0 CANDIDAL STOMATITIS: ICD-10-CM

## 2018-08-08 DIAGNOSIS — J90 PLEURAL EFFUSION, NOT ELSEWHERE CLASSIFIED: ICD-10-CM

## 2018-08-08 PROCEDURE — 99215 OFFICE O/P EST HI 40 MIN: CPT

## 2018-08-10 PROBLEM — B37.0 THRUSH: Status: ACTIVE | Noted: 2018-08-10

## 2018-08-10 RX ORDER — FLUCONAZOLE 100 MG/1
100 TABLET ORAL DAILY
Qty: 5 | Refills: 0 | Status: ACTIVE | COMMUNITY
Start: 2018-08-10 | End: 1900-01-01

## 2018-08-22 ENCOUNTER — APPOINTMENT (OUTPATIENT)
Dept: HEMATOLOGY ONCOLOGY | Facility: CLINIC | Age: 83
End: 2018-08-22
Payer: MEDICARE

## 2018-08-29 ENCOUNTER — APPOINTMENT (OUTPATIENT)
Dept: HEMATOLOGY ONCOLOGY | Facility: CLINIC | Age: 83
End: 2018-08-29
Payer: MEDICARE

## 2018-08-29 VITALS
DIASTOLIC BLOOD PRESSURE: 57 MMHG | RESPIRATION RATE: 24 BRPM | HEIGHT: 66.97 IN | TEMPERATURE: 97.7 F | HEART RATE: 94 BPM | OXYGEN SATURATION: 99 % | SYSTOLIC BLOOD PRESSURE: 83 MMHG

## 2018-08-29 DIAGNOSIS — C78.7 MALIGNANT NEOPLASM OF COLON, UNSPECIFIED: ICD-10-CM

## 2018-08-29 DIAGNOSIS — Z79.899 OTHER LONG TERM (CURRENT) DRUG THERAPY: ICD-10-CM

## 2018-08-29 DIAGNOSIS — C18.9 MALIGNANT NEOPLASM OF COLON, UNSPECIFIED: ICD-10-CM

## 2018-08-29 DIAGNOSIS — Z51.11 ENCOUNTER FOR ANTINEOPLASTIC CHEMOTHERAPY: ICD-10-CM

## 2018-08-29 PROCEDURE — 99215 OFFICE O/P EST HI 40 MIN: CPT

## 2018-09-12 ENCOUNTER — APPOINTMENT (OUTPATIENT)
Dept: HEMATOLOGY ONCOLOGY | Facility: CLINIC | Age: 83
End: 2018-09-12